# Patient Record
Sex: FEMALE | Race: WHITE | NOT HISPANIC OR LATINO | URBAN - METROPOLITAN AREA
[De-identification: names, ages, dates, MRNs, and addresses within clinical notes are randomized per-mention and may not be internally consistent; named-entity substitution may affect disease eponyms.]

---

## 2017-04-18 ENCOUNTER — EMERGENCY (EMERGENCY)
Facility: HOSPITAL | Age: 82
LOS: 1 days | Discharge: HOME | End: 2017-04-18
Attending: EMERGENCY MEDICINE | Admitting: EMERGENCY MEDICINE
Payer: MEDICARE

## 2017-04-18 VITALS
DIASTOLIC BLOOD PRESSURE: 85 MMHG | TEMPERATURE: 97 F | OXYGEN SATURATION: 100 % | HEART RATE: 58 BPM | SYSTOLIC BLOOD PRESSURE: 177 MMHG | RESPIRATION RATE: 18 BRPM

## 2017-04-18 VITALS
DIASTOLIC BLOOD PRESSURE: 62 MMHG | OXYGEN SATURATION: 99 % | SYSTOLIC BLOOD PRESSURE: 138 MMHG | HEART RATE: 53 BPM | RESPIRATION RATE: 16 BRPM

## 2017-04-18 DIAGNOSIS — M20.10 HALLUX VALGUS (ACQUIRED), UNSPECIFIED FOOT: Chronic | ICD-10-CM

## 2017-04-18 DIAGNOSIS — Z98.49 CATARACT EXTRACTION STATUS, UNSPECIFIED EYE: Chronic | ICD-10-CM

## 2017-04-18 LAB
ALBUMIN SERPL ELPH-MCNC: 4.3 G/DL — SIGNIFICANT CHANGE UP (ref 3.3–5)
ALP SERPL-CCNC: 69 U/L — SIGNIFICANT CHANGE UP (ref 40–120)
ALT FLD-CCNC: 15 U/L — SIGNIFICANT CHANGE UP (ref 4–33)
AST SERPL-CCNC: 17 U/L — SIGNIFICANT CHANGE UP (ref 4–32)
BASOPHILS # BLD AUTO: 0.01 K/UL — SIGNIFICANT CHANGE UP (ref 0–0.2)
BASOPHILS NFR BLD AUTO: 0.2 % — SIGNIFICANT CHANGE UP (ref 0–2)
BILIRUB SERPL-MCNC: 0.3 MG/DL — SIGNIFICANT CHANGE UP (ref 0.2–1.2)
BUN SERPL-MCNC: 15 MG/DL — SIGNIFICANT CHANGE UP (ref 7–23)
CALCIUM SERPL-MCNC: 8.9 MG/DL — SIGNIFICANT CHANGE UP (ref 8.4–10.5)
CHLORIDE SERPL-SCNC: 92 MMOL/L — LOW (ref 98–107)
CO2 SERPL-SCNC: 27 MMOL/L — SIGNIFICANT CHANGE UP (ref 22–31)
CREAT SERPL-MCNC: 0.7 MG/DL — SIGNIFICANT CHANGE UP (ref 0.5–1.3)
EOSINOPHIL # BLD AUTO: 0.13 K/UL — SIGNIFICANT CHANGE UP (ref 0–0.5)
EOSINOPHIL NFR BLD AUTO: 2.1 % — SIGNIFICANT CHANGE UP (ref 0–6)
GLUCOSE SERPL-MCNC: 111 MG/DL — HIGH (ref 70–99)
HCT VFR BLD CALC: 36.3 % — SIGNIFICANT CHANGE UP (ref 34.5–45)
HGB BLD-MCNC: 12.3 G/DL — SIGNIFICANT CHANGE UP (ref 11.5–15.5)
IMM GRANULOCYTES NFR BLD AUTO: 0 % — SIGNIFICANT CHANGE UP (ref 0–1.5)
LYMPHOCYTES # BLD AUTO: 2.1 K/UL — SIGNIFICANT CHANGE UP (ref 1–3.3)
LYMPHOCYTES # BLD AUTO: 33.2 % — SIGNIFICANT CHANGE UP (ref 13–44)
MCHC RBC-ENTMCNC: 30.1 PG — SIGNIFICANT CHANGE UP (ref 27–34)
MCHC RBC-ENTMCNC: 33.9 % — SIGNIFICANT CHANGE UP (ref 32–36)
MCV RBC AUTO: 89 FL — SIGNIFICANT CHANGE UP (ref 80–100)
MONOCYTES # BLD AUTO: 0.33 K/UL — SIGNIFICANT CHANGE UP (ref 0–0.9)
MONOCYTES NFR BLD AUTO: 5.2 % — SIGNIFICANT CHANGE UP (ref 2–14)
NEUTROPHILS # BLD AUTO: 3.76 K/UL — SIGNIFICANT CHANGE UP (ref 1.8–7.4)
NEUTROPHILS NFR BLD AUTO: 59.3 % — SIGNIFICANT CHANGE UP (ref 43–77)
PLATELET # BLD AUTO: 257 K/UL — SIGNIFICANT CHANGE UP (ref 150–400)
PMV BLD: 9.7 FL — SIGNIFICANT CHANGE UP (ref 7–13)
POTASSIUM SERPL-MCNC: 4.1 MMOL/L — SIGNIFICANT CHANGE UP (ref 3.5–5.3)
POTASSIUM SERPL-SCNC: 4.1 MMOL/L — SIGNIFICANT CHANGE UP (ref 3.5–5.3)
PROT SERPL-MCNC: 7 G/DL — SIGNIFICANT CHANGE UP (ref 6–8.3)
RBC # BLD: 4.08 M/UL — SIGNIFICANT CHANGE UP (ref 3.8–5.2)
RBC # FLD: 12.1 % — SIGNIFICANT CHANGE UP (ref 10.3–14.5)
SODIUM SERPL-SCNC: 130 MMOL/L — LOW (ref 135–145)
WBC # BLD: 6.33 K/UL — SIGNIFICANT CHANGE UP (ref 3.8–10.5)
WBC # FLD AUTO: 6.33 K/UL — SIGNIFICANT CHANGE UP (ref 3.8–10.5)

## 2017-04-18 PROCEDURE — 93971 EXTREMITY STUDY: CPT | Mod: 26,LT

## 2017-04-18 PROCEDURE — 99285 EMERGENCY DEPT VISIT HI MDM: CPT | Mod: GC

## 2017-04-18 RX ORDER — METOCLOPRAMIDE HCL 10 MG
10 TABLET ORAL ONCE
Qty: 0 | Refills: 0 | Status: COMPLETED | OUTPATIENT
Start: 2017-04-18 | End: 2017-04-18

## 2017-04-18 RX ORDER — KETOROLAC TROMETHAMINE 30 MG/ML
15 SYRINGE (ML) INJECTION ONCE
Qty: 0 | Refills: 0 | Status: DISCONTINUED | OUTPATIENT
Start: 2017-04-18 | End: 2017-04-18

## 2017-04-18 RX ORDER — OXYCODONE HYDROCHLORIDE 5 MG/1
1 TABLET ORAL
Qty: 20 | Refills: 0 | OUTPATIENT
Start: 2017-04-18 | End: 2017-04-23

## 2017-04-18 RX ADMIN — Medication 10 MILLIGRAM(S): at 20:16

## 2017-04-18 RX ADMIN — Medication 15 MILLIGRAM(S): at 21:30

## 2017-04-18 RX ADMIN — Medication 15 MILLIGRAM(S): at 21:13

## 2017-04-18 NOTE — ED PROVIDER NOTE - OBJECTIVE STATEMENT
82 yo F states has been having LBP and R knee pain for ~1 wk, atraumatic, and notes elev. BPs over this time as well. Went to PMD and was Rx BP med 5d ago. Last night at 2A had inc. in HA and noted elev. BP. Saw MD and was Rx new additional BP med, which she took x 1. Tonight to ED for cont. HA and elev. BP. Denies CP/N/V/SOB/diaphoresis, denies change in vision, denies change in speech, no neck pain or stiffness, ambulates easily but c/o R knee pain.

## 2017-04-18 NOTE — ED PROVIDER NOTE - PROGRESS NOTE DETAILS
Pt. with BP normalized after HA relief with Reglan. cont. to c/o R knee and now states has L sided CP with area of tenderness to lateral CW, non-pleuritic. EKG normal.

## 2017-04-18 NOTE — ED ADULT NURSE NOTE - OBJECTIVE STATEMENT
headache started at 0200. pt took tylenol without success. started iv 20g left ac, blood drawn. photophobia and nausea.

## 2017-04-18 NOTE — ED ADULT TRIAGE NOTE - CHIEF COMPLAINT QUOTE
Pt c/o headache that began yesterday with no relief from tylenol. Associated with N/V, photosensitivity and floaters in left eye. Denies any PMH.

## 2017-04-18 NOTE — ED ADULT NURSE REASSESSMENT NOTE - NS ED NURSE REASSESS COMMENT FT1
pt. resting in bed, reports an improvement in her HA down to a 3/10 but now reports pain to the L arm after administration of MD Pete Hogan at the bedside re-assessing pt., advised to administer Toradol and re-assess.  Vital signs stable. Will continue to monitor.

## 2017-05-16 ENCOUNTER — EMERGENCY (EMERGENCY)
Facility: HOSPITAL | Age: 82
LOS: 1 days | Discharge: HOME | End: 2017-05-16
Attending: EMERGENCY MEDICINE | Admitting: EMERGENCY MEDICINE
Payer: MEDICARE

## 2017-05-16 VITALS
SYSTOLIC BLOOD PRESSURE: 163 MMHG | DIASTOLIC BLOOD PRESSURE: 81 MMHG | HEART RATE: 60 BPM | TEMPERATURE: 98 F | RESPIRATION RATE: 18 BRPM | OXYGEN SATURATION: 99 %

## 2017-05-16 DIAGNOSIS — M20.10 HALLUX VALGUS (ACQUIRED), UNSPECIFIED FOOT: Chronic | ICD-10-CM

## 2017-05-16 DIAGNOSIS — Z98.49 CATARACT EXTRACTION STATUS, UNSPECIFIED EYE: Chronic | ICD-10-CM

## 2017-05-16 LAB
ALBUMIN SERPL ELPH-MCNC: 4.2 G/DL — SIGNIFICANT CHANGE UP (ref 3.3–5)
ALP SERPL-CCNC: 70 U/L — SIGNIFICANT CHANGE UP (ref 40–120)
ALT FLD-CCNC: 15 U/L — SIGNIFICANT CHANGE UP (ref 4–33)
APTT BLD: 26.6 SEC — LOW (ref 27.5–37.4)
AST SERPL-CCNC: 19 U/L — SIGNIFICANT CHANGE UP (ref 4–32)
BASOPHILS # BLD AUTO: 0.01 K/UL — SIGNIFICANT CHANGE UP (ref 0–0.2)
BASOPHILS NFR BLD AUTO: 0.2 % — SIGNIFICANT CHANGE UP (ref 0–2)
BILIRUB SERPL-MCNC: 0.5 MG/DL — SIGNIFICANT CHANGE UP (ref 0.2–1.2)
BUN SERPL-MCNC: 13 MG/DL — SIGNIFICANT CHANGE UP (ref 7–23)
CALCIUM SERPL-MCNC: 9.1 MG/DL — SIGNIFICANT CHANGE UP (ref 8.4–10.5)
CHLORIDE SERPL-SCNC: 87 MMOL/L — LOW (ref 98–107)
CK MB BLD-MCNC: 2.28 NG/ML — SIGNIFICANT CHANGE UP (ref 1–4.7)
CK MB BLD-MCNC: SIGNIFICANT CHANGE UP (ref 0–2.5)
CK SERPL-CCNC: 49 U/L — SIGNIFICANT CHANGE UP (ref 25–170)
CO2 SERPL-SCNC: 24 MMOL/L — SIGNIFICANT CHANGE UP (ref 22–31)
CREAT SERPL-MCNC: 0.52 MG/DL — SIGNIFICANT CHANGE UP (ref 0.5–1.3)
EOSINOPHIL # BLD AUTO: 0.02 K/UL — SIGNIFICANT CHANGE UP (ref 0–0.5)
EOSINOPHIL NFR BLD AUTO: 0.4 % — SIGNIFICANT CHANGE UP (ref 0–6)
ERYTHROCYTE [SEDIMENTATION RATE] IN BLOOD: 12 MM/HR — SIGNIFICANT CHANGE UP (ref 4–25)
GLUCOSE SERPL-MCNC: 128 MG/DL — HIGH (ref 70–99)
HCT VFR BLD CALC: 34.5 % — SIGNIFICANT CHANGE UP (ref 34.5–45)
HGB BLD-MCNC: 12.3 G/DL — SIGNIFICANT CHANGE UP (ref 11.5–15.5)
IMM GRANULOCYTES NFR BLD AUTO: 0 % — SIGNIFICANT CHANGE UP (ref 0–1.5)
INR BLD: 0.96 — SIGNIFICANT CHANGE UP (ref 0.88–1.17)
LYMPHOCYTES # BLD AUTO: 1.23 K/UL — SIGNIFICANT CHANGE UP (ref 1–3.3)
LYMPHOCYTES # BLD AUTO: 23.7 % — SIGNIFICANT CHANGE UP (ref 13–44)
MCHC RBC-ENTMCNC: 30.3 PG — SIGNIFICANT CHANGE UP (ref 27–34)
MCHC RBC-ENTMCNC: 35.7 % — SIGNIFICANT CHANGE UP (ref 32–36)
MCV RBC AUTO: 85 FL — SIGNIFICANT CHANGE UP (ref 80–100)
MONOCYTES # BLD AUTO: 0.39 K/UL — SIGNIFICANT CHANGE UP (ref 0–0.9)
MONOCYTES NFR BLD AUTO: 7.5 % — SIGNIFICANT CHANGE UP (ref 2–14)
NEUTROPHILS # BLD AUTO: 3.53 K/UL — SIGNIFICANT CHANGE UP (ref 1.8–7.4)
NEUTROPHILS NFR BLD AUTO: 68.2 % — SIGNIFICANT CHANGE UP (ref 43–77)
PLATELET # BLD AUTO: 256 K/UL — SIGNIFICANT CHANGE UP (ref 150–400)
PMV BLD: 9.6 FL — SIGNIFICANT CHANGE UP (ref 7–13)
POTASSIUM SERPL-MCNC: 3.2 MMOL/L — LOW (ref 3.5–5.3)
POTASSIUM SERPL-SCNC: 3.2 MMOL/L — LOW (ref 3.5–5.3)
PROT SERPL-MCNC: 6.8 G/DL — SIGNIFICANT CHANGE UP (ref 6–8.3)
PROTHROM AB SERPL-ACNC: 10.8 SEC — SIGNIFICANT CHANGE UP (ref 9.8–13.1)
RBC # BLD: 4.06 M/UL — SIGNIFICANT CHANGE UP (ref 3.8–5.2)
RBC # FLD: 12.1 % — SIGNIFICANT CHANGE UP (ref 10.3–14.5)
SODIUM SERPL-SCNC: 126 MMOL/L — LOW (ref 135–145)
TROPONIN T SERPL-MCNC: < 0.06 NG/ML — SIGNIFICANT CHANGE UP (ref 0–0.06)
WBC # BLD: 5.18 K/UL — SIGNIFICANT CHANGE UP (ref 3.8–10.5)
WBC # FLD AUTO: 5.18 K/UL — SIGNIFICANT CHANGE UP (ref 3.8–10.5)

## 2017-05-16 PROCEDURE — 99285 EMERGENCY DEPT VISIT HI MDM: CPT | Mod: GC

## 2017-05-16 RX ORDER — METOCLOPRAMIDE HCL 10 MG
10 TABLET ORAL ONCE
Qty: 0 | Refills: 0 | Status: COMPLETED | OUTPATIENT
Start: 2017-05-16 | End: 2017-05-16

## 2017-05-16 RX ORDER — ACETAMINOPHEN 500 MG
975 TABLET ORAL ONCE
Qty: 0 | Refills: 0 | Status: COMPLETED | OUTPATIENT
Start: 2017-05-16 | End: 2017-05-16

## 2017-05-16 RX ORDER — SODIUM CHLORIDE 9 MG/ML
1000 INJECTION INTRAMUSCULAR; INTRAVENOUS; SUBCUTANEOUS ONCE
Qty: 0 | Refills: 0 | Status: COMPLETED | OUTPATIENT
Start: 2017-05-16 | End: 2017-05-16

## 2017-05-16 RX ADMIN — SODIUM CHLORIDE 1000 MILLILITER(S): 9 INJECTION INTRAMUSCULAR; INTRAVENOUS; SUBCUTANEOUS at 22:45

## 2017-05-16 RX ADMIN — Medication 975 MILLIGRAM(S): at 22:35

## 2017-05-16 RX ADMIN — Medication 10 MILLIGRAM(S): at 22:45

## 2017-05-16 NOTE — ED ADULT NURSE NOTE - OBJECTIVE STATEMENT
rec'd pt aaox3 in TR B c/o 10/10 HA x 1 month, pt. followed by a neurologist who stated that the blood work done this morning "showed signs of inflammation" as per family, unsure of which lab markers the neurologist was referring to, MD also stated that pt. is at risk for a CVA.  Pt. reports nausea and 4 episodes of non-bloody emesis, family states that pt. is nauseous on a daily basis and vomits almost everyday.  Pt. had an MRI completed which showed "microvascular ischemic changes".  Pt. reports generalized weakness, sensitivity to light, dizziness.  PERRLA.  No neuro deficits noted at this time.  Pt. presently hypertensive, states that HA's become more severe when pt.'s BP is high, pt. takes Losartan 50mg BID, last dose at approximately 6pm, but vomited shortly thereafter, pt. also took 500mg of tylenol at about 7pm but reported no relief.  Pt. denies CP/SOB, urinary symptoms, visual changes.  20G IV saline lock placed in the R AC, labs drawn and sent as ordered.   Will continue to monitor.

## 2017-05-16 NOTE — ED PROVIDER NOTE - PHYSICAL EXAMINATION
Neck supple without nuchal rigidity or stiffness.  Full range of motion.  Patient remains alert and oriented to person, place, and time without confusion, lethargy, or alteration in mental status.  No focal neurological sign.   No petechial rash or skin lesions. Neck supple without nuchal rigidity or stiffness.  Full range of motion.  Patient remains alert and oriented to person, place, and time without confusion, lethargy, or alteration in mental status.  No focal neurological sign.   No petechial rash or skin lesions.  ATTENDING PHYSICAL EXAM DR. Walker ***GEN - NAD; well appearing; A+O x3 ***HEAD - NC/AT ***EYES/NOSE - PERRL, EOMI, mucous membranes moist, no discharge, no Temporal artery area tenderness b/l ***THROAT: Oral cavity and pharynx normal. No inflammation, swelling, exudate, or lesions.  ***NECK: Neck supple, non-tender without lymphadenopathy, no masses, no JVD.   ***PULMONARY - CTA b/l, symmetric breath sounds. ***CARDIAC -s1s2, RRR, no M,R,G  ***ABDOMEN - +BS, ND, NT, soft, no guarding, no rebound, no masses   ***BACK - no CVA tenderness, Normal  spine ***EXTREMITIES - symmetric pulses, 2+ dp, capillary refill < 2 seconds, no clubbing, no cyanosis, no edema ***SKIN - no rash or bruising   ***NEUROLOGIC - alert, CN 2-12 intact, reflexes nl, sensation nl, coordination nl, finger to nose nl, romberg negative, motor 5/5 RUE/LUE/RLE/LLE/EHL/Plantar flexion, no pronator drift, gait nl

## 2017-05-16 NOTE — ED PROVIDER NOTE - MEDICAL DECISION MAKING DETAILS
81 YOF PMH arthritis, GERD, DJD, p/w headache and vision changes for the past month.  HTN.  Non-focal neurology exam.  R/o temporal arteritis, CTH to evaluate for interval change.  If HTN persists Tx for hypertensive urgency.  Neurology consult.  Likely admit.  No clinical findings of infection. PT afebrile, no AMS, or neck stiffness.  Treat symptomatically and reassess. 81 YOF PMH arthritis, GERD, DJD, p/w headache and vision changes for the past month.  HTN.  Non-focal neurology exam.  R/o temporal arteritis, CTH to evaluate for interval change.  If HTN persists Tx for hypertensive urgency.  Neurology consult. No clinical findings of infection. PT afebrile, no AMS, or neck stiffness.  Treat symptomatically and reassess.

## 2017-05-16 NOTE — ED PROVIDER NOTE - PROGRESS NOTE DETAILS
pt states headache is improved, does not want more meds, will follow up with neurology as an outpatient: Dr. Mooney: [phone]

## 2017-05-16 NOTE — ED ADULT TRIAGE NOTE - CHIEF COMPLAINT QUOTE
pt c/o headache x 1 month seen and treated in ED for HA dx with microvascular ischemic disease. family states pt had labwork done today and was told she had suspected inflammation, possible intracranial inflammation, and was told pt was at risk for cva. family also endorses pt having generalized weakness, nausea, vomiting, unable to keep anything down. family also reports pt has generalized weakness and "eye strain" intermittently x 1 month and intermittent confusion. family also reports pt headaches are worse at night. pt appears very uncomfortable.  PMHX: herniated disc, arthritis    pt evaluated by Dr. Navarrete, no stroke code called.

## 2017-05-16 NOTE — ED PROVIDER NOTE - OBJECTIVE STATEMENT
81 YOF PMH arthritis, GERD, DJD, p/w headache and vision changes for the past month.  Sx gradual onset and persistent.  Associated nausea, vomiting, tinnitus, visual changes, and new onset HT.  PT seen by neurologist who performed an MRI 02 May 17 that demonstrated microvascular change. Pt allergic to Motrin has been taking Tylenol for her Sx without relief.  Pt seen by neurologist today who sent more blood work which is pending.  No fevers, chills, sweats, weight loss, fatigue, or malaise.  No neck stiffness, altered mental status, rash, fever. 81 YOF PMH arthritis, GERD, DJD, p/w headache for the past month.  Sx gradual onset and persistent.  Associated nausea, vomiting, tinnitus, visual changes, and new onset HT.  PT seen by neurologist who performed an MRI 02 May 17 that demonstrated microvascular change. Pt allergic to Motrin has been taking Tylenol for her Sx without relief.  Pt seen by neurologist today who sent more blood work which is pending.  No fevers, chills, sweats, weight loss, fatigue, or malaise.  No neck stiffness, altered mental status, rash, fever. 81 YOF PMH arthritis, GERD, DJD, p/w headache for the past month.  Sx gradual onset and persistent.  Associated nausea, vomiting, tinnitus, visual changes, and new onset HT.  PT seen by neurologist who performed an MRI 02 May 17 that demonstrated microvascular change. Pt allergic to Motrin has been taking Tylenol for her Sx without relief.  Pt seen by neurologist today who sent more blood work which is pending.  No fevers, chills, sweats, weight loss, fatigue, or malaise.  No neck stiffness, altered mental status, rash, fever.  Attending - Agree with above.  I evaluated patient myself.  82 y/o F with children at bedside, headache constantly present x 2 months per patient, waxes and wanes in intensity.  Associated nausea and vomiting.  Tinnitus.  Has had neurological w/u including MRI, which has been unrevealing for etiology.  To neurologist today who recommended she gets blood test for 'inflammation', likely ESR.  No preceding trauma or illness.  No fever.

## 2017-05-17 VITALS
DIASTOLIC BLOOD PRESSURE: 67 MMHG | SYSTOLIC BLOOD PRESSURE: 138 MMHG | OXYGEN SATURATION: 100 % | RESPIRATION RATE: 12 BRPM | HEART RATE: 56 BPM | TEMPERATURE: 98 F

## 2017-05-17 PROCEDURE — 70496 CT ANGIOGRAPHY HEAD: CPT | Mod: 26

## 2017-05-17 PROCEDURE — 70498 CT ANGIOGRAPHY NECK: CPT | Mod: 26,52

## 2017-05-17 RX ORDER — ONDANSETRON 8 MG/1
1 TABLET, FILM COATED ORAL
Qty: 12 | Refills: 0 | OUTPATIENT
Start: 2017-05-17 | End: 2017-05-21

## 2017-05-17 RX ORDER — DEXAMETHASONE 0.5 MG/5ML
10 ELIXIR ORAL ONCE
Qty: 0 | Refills: 0 | Status: COMPLETED | OUTPATIENT
Start: 2017-05-17 | End: 2017-05-17

## 2017-05-17 RX ORDER — ONDANSETRON 8 MG/1
4 TABLET, FILM COATED ORAL ONCE
Qty: 0 | Refills: 0 | Status: COMPLETED | OUTPATIENT
Start: 2017-05-17 | End: 2017-05-17

## 2017-05-17 RX ORDER — DEXAMETHASONE 0.5 MG/5ML
10 ELIXIR ORAL ONCE
Qty: 0 | Refills: 0 | Status: DISCONTINUED | OUTPATIENT
Start: 2017-05-17 | End: 2017-05-17

## 2017-05-17 RX ADMIN — ONDANSETRON 4 MILLIGRAM(S): 8 TABLET, FILM COATED ORAL at 07:35

## 2017-05-17 RX ADMIN — Medication 102 MILLIGRAM(S): at 01:32

## 2018-01-29 ENCOUNTER — INPATIENT (INPATIENT)
Facility: HOSPITAL | Age: 83
LOS: 6 days | Discharge: HOME | DRG: 194 | End: 2018-02-05
Attending: INTERNAL MEDICINE | Admitting: INTERNAL MEDICINE
Payer: MEDICARE

## 2018-01-29 VITALS
RESPIRATION RATE: 18 BRPM | TEMPERATURE: 99 F | DIASTOLIC BLOOD PRESSURE: 62 MMHG | SYSTOLIC BLOOD PRESSURE: 163 MMHG | OXYGEN SATURATION: 99 % | HEART RATE: 80 BPM

## 2018-01-29 DIAGNOSIS — Z29.9 ENCOUNTER FOR PROPHYLACTIC MEASURES, UNSPECIFIED: ICD-10-CM

## 2018-01-29 DIAGNOSIS — E87.1 HYPO-OSMOLALITY AND HYPONATREMIA: ICD-10-CM

## 2018-01-29 DIAGNOSIS — M20.10 HALLUX VALGUS (ACQUIRED), UNSPECIFIED FOOT: Chronic | ICD-10-CM

## 2018-01-29 DIAGNOSIS — Z98.49 CATARACT EXTRACTION STATUS, UNSPECIFIED EYE: Chronic | ICD-10-CM

## 2018-01-29 DIAGNOSIS — R53.1 WEAKNESS: ICD-10-CM

## 2018-01-29 LAB
ALBUMIN SERPL ELPH-MCNC: 3.6 G/DL — SIGNIFICANT CHANGE UP (ref 3.5–5)
ALP SERPL-CCNC: 102 U/L — SIGNIFICANT CHANGE UP (ref 40–120)
ALT FLD-CCNC: 20 U/L DA — SIGNIFICANT CHANGE UP (ref 10–60)
ANION GAP SERPL CALC-SCNC: 8 MMOL/L — SIGNIFICANT CHANGE UP (ref 5–17)
APPEARANCE UR: CLEAR — SIGNIFICANT CHANGE UP
APTT BLD: 26.7 SEC — LOW (ref 27.5–37.4)
AST SERPL-CCNC: 23 U/L — SIGNIFICANT CHANGE UP (ref 10–40)
BASOPHILS # BLD AUTO: 0 K/UL — SIGNIFICANT CHANGE UP (ref 0–0.2)
BASOPHILS NFR BLD AUTO: 0.5 % — SIGNIFICANT CHANGE UP (ref 0–2)
BILIRUB SERPL-MCNC: 0.5 MG/DL — SIGNIFICANT CHANGE UP (ref 0.2–1.2)
BILIRUB UR-MCNC: NEGATIVE — SIGNIFICANT CHANGE UP
BUN SERPL-MCNC: 9 MG/DL — SIGNIFICANT CHANGE UP (ref 7–18)
CALCIUM SERPL-MCNC: 8.2 MG/DL — LOW (ref 8.4–10.5)
CHLORIDE SERPL-SCNC: 93 MMOL/L — LOW (ref 96–108)
CK MB CFR SERPL CALC: <1 NG/ML — SIGNIFICANT CHANGE UP (ref 0–3.6)
CO2 SERPL-SCNC: 27 MMOL/L — SIGNIFICANT CHANGE UP (ref 22–31)
COLOR SPEC: YELLOW — SIGNIFICANT CHANGE UP
CREAT SERPL-MCNC: 0.64 MG/DL — SIGNIFICANT CHANGE UP (ref 0.5–1.3)
DIFF PNL FLD: ABNORMAL
EOSINOPHIL # BLD AUTO: 0 K/UL — SIGNIFICANT CHANGE UP (ref 0–0.5)
EOSINOPHIL NFR BLD AUTO: 0 % — SIGNIFICANT CHANGE UP (ref 0–6)
GLUCOSE SERPL-MCNC: 98 MG/DL — SIGNIFICANT CHANGE UP (ref 70–99)
GLUCOSE UR QL: NEGATIVE — SIGNIFICANT CHANGE UP
HCT VFR BLD CALC: 37.7 % — SIGNIFICANT CHANGE UP (ref 34.5–45)
HGB BLD-MCNC: 12.8 G/DL — SIGNIFICANT CHANGE UP (ref 11.5–15.5)
INR BLD: 1.04 RATIO — SIGNIFICANT CHANGE UP (ref 0.88–1.16)
KETONES UR-MCNC: ABNORMAL
LEUKOCYTE ESTERASE UR-ACNC: NEGATIVE — SIGNIFICANT CHANGE UP
LYMPHOCYTES # BLD AUTO: 0.5 K/UL — LOW (ref 1–3.3)
LYMPHOCYTES # BLD AUTO: 6 % — LOW (ref 13–44)
MAGNESIUM SERPL-MCNC: 1.8 MG/DL — SIGNIFICANT CHANGE UP (ref 1.6–2.6)
MCHC RBC-ENTMCNC: 30.2 PG — SIGNIFICANT CHANGE UP (ref 27–34)
MCHC RBC-ENTMCNC: 33.9 GM/DL — SIGNIFICANT CHANGE UP (ref 32–36)
MCV RBC AUTO: 89.1 FL — SIGNIFICANT CHANGE UP (ref 80–100)
MONOCYTES # BLD AUTO: 0.5 K/UL — SIGNIFICANT CHANGE UP (ref 0–0.9)
MONOCYTES NFR BLD AUTO: 5.9 % — SIGNIFICANT CHANGE UP (ref 2–14)
NEUTROPHILS # BLD AUTO: 7.4 K/UL — SIGNIFICANT CHANGE UP (ref 1.8–7.4)
NEUTROPHILS NFR BLD AUTO: 87.5 % — HIGH (ref 43–77)
NITRITE UR-MCNC: NEGATIVE — SIGNIFICANT CHANGE UP
PH UR: 7 — SIGNIFICANT CHANGE UP (ref 5–8)
PLATELET # BLD AUTO: 174 K/UL — SIGNIFICANT CHANGE UP (ref 150–400)
POTASSIUM SERPL-MCNC: 3.6 MMOL/L — SIGNIFICANT CHANGE UP (ref 3.5–5.3)
POTASSIUM SERPL-SCNC: 3.6 MMOL/L — SIGNIFICANT CHANGE UP (ref 3.5–5.3)
PROT SERPL-MCNC: 7.4 G/DL — SIGNIFICANT CHANGE UP (ref 6–8.3)
PROT UR-MCNC: 15
PROTHROM AB SERPL-ACNC: 11.4 SEC — SIGNIFICANT CHANGE UP (ref 9.8–12.7)
RBC # BLD: 4.23 M/UL — SIGNIFICANT CHANGE UP (ref 3.8–5.2)
RBC # FLD: 11.2 % — SIGNIFICANT CHANGE UP (ref 10.3–14.5)
SODIUM SERPL-SCNC: 128 MMOL/L — LOW (ref 135–145)
SP GR SPEC: 1 — LOW (ref 1.01–1.02)
TROPONIN I SERPL-MCNC: <0.015 NG/ML — SIGNIFICANT CHANGE UP (ref 0–0.04)
UROBILINOGEN FLD QL: NEGATIVE — SIGNIFICANT CHANGE UP
WBC # BLD: 8.5 K/UL — SIGNIFICANT CHANGE UP (ref 3.8–10.5)
WBC # FLD AUTO: 8.5 K/UL — SIGNIFICANT CHANGE UP (ref 3.8–10.5)

## 2018-01-29 PROCEDURE — 71045 X-RAY EXAM CHEST 1 VIEW: CPT | Mod: 26

## 2018-01-29 PROCEDURE — 70450 CT HEAD/BRAIN W/O DYE: CPT | Mod: 26

## 2018-01-29 PROCEDURE — 99285 EMERGENCY DEPT VISIT HI MDM: CPT

## 2018-01-29 PROCEDURE — 71250 CT THORAX DX C-: CPT | Mod: 26

## 2018-01-29 RX ORDER — ATORVASTATIN CALCIUM 80 MG/1
1 TABLET, FILM COATED ORAL
Qty: 0 | Refills: 0 | COMMUNITY

## 2018-01-29 RX ORDER — PANTOPRAZOLE SODIUM 20 MG/1
40 TABLET, DELAYED RELEASE ORAL
Qty: 0 | Refills: 0 | Status: DISCONTINUED | OUTPATIENT
Start: 2018-01-29 | End: 2018-02-05

## 2018-01-29 RX ORDER — SODIUM CHLORIDE 9 MG/ML
1000 INJECTION INTRAMUSCULAR; INTRAVENOUS; SUBCUTANEOUS ONCE
Qty: 0 | Refills: 0 | Status: COMPLETED | OUTPATIENT
Start: 2018-01-29 | End: 2018-01-29

## 2018-01-29 RX ORDER — CEFTRIAXONE 500 MG/1
INJECTION, POWDER, FOR SOLUTION INTRAMUSCULAR; INTRAVENOUS
Qty: 0 | Refills: 0 | Status: DISCONTINUED | OUTPATIENT
Start: 2018-01-29 | End: 2018-02-03

## 2018-01-29 RX ORDER — PANTOPRAZOLE SODIUM 20 MG/1
1 TABLET, DELAYED RELEASE ORAL
Qty: 0 | Refills: 0 | COMMUNITY

## 2018-01-29 RX ORDER — AZITHROMYCIN 500 MG/1
500 TABLET, FILM COATED ORAL ONCE
Qty: 0 | Refills: 0 | Status: COMPLETED | OUTPATIENT
Start: 2018-01-29 | End: 2018-01-29

## 2018-01-29 RX ORDER — ENOXAPARIN SODIUM 100 MG/ML
40 INJECTION SUBCUTANEOUS DAILY
Qty: 0 | Refills: 0 | Status: DISCONTINUED | OUTPATIENT
Start: 2018-01-29 | End: 2018-02-05

## 2018-01-29 RX ORDER — AZITHROMYCIN 500 MG/1
TABLET, FILM COATED ORAL
Qty: 0 | Refills: 0 | Status: DISCONTINUED | OUTPATIENT
Start: 2018-01-29 | End: 2018-02-03

## 2018-01-29 RX ORDER — CEFTRIAXONE 500 MG/1
1 INJECTION, POWDER, FOR SOLUTION INTRAMUSCULAR; INTRAVENOUS ONCE
Qty: 0 | Refills: 0 | Status: COMPLETED | OUTPATIENT
Start: 2018-01-29 | End: 2018-01-29

## 2018-01-29 RX ORDER — AZITHROMYCIN 500 MG/1
500 TABLET, FILM COATED ORAL EVERY 24 HOURS
Qty: 0 | Refills: 0 | Status: DISCONTINUED | OUTPATIENT
Start: 2018-01-30 | End: 2018-02-03

## 2018-01-29 RX ORDER — ALBUTEROL 90 UG/1
0 AEROSOL, METERED ORAL
Qty: 18 | Refills: 0 | COMMUNITY

## 2018-01-29 RX ORDER — IPRATROPIUM/ALBUTEROL SULFATE 18-103MCG
3 AEROSOL WITH ADAPTER (GRAM) INHALATION EVERY 6 HOURS
Qty: 0 | Refills: 0 | Status: DISCONTINUED | OUTPATIENT
Start: 2018-01-29 | End: 2018-02-05

## 2018-01-29 RX ORDER — METOCLOPRAMIDE HCL 10 MG
10 TABLET ORAL ONCE
Qty: 0 | Refills: 0 | Status: COMPLETED | OUTPATIENT
Start: 2018-01-29 | End: 2018-01-29

## 2018-01-29 RX ORDER — CEFTRIAXONE 500 MG/1
1 INJECTION, POWDER, FOR SOLUTION INTRAMUSCULAR; INTRAVENOUS EVERY 24 HOURS
Qty: 0 | Refills: 0 | Status: DISCONTINUED | OUTPATIENT
Start: 2018-01-30 | End: 2018-02-03

## 2018-01-29 RX ORDER — ATORVASTATIN CALCIUM 80 MG/1
10 TABLET, FILM COATED ORAL AT BEDTIME
Qty: 0 | Refills: 0 | Status: DISCONTINUED | OUTPATIENT
Start: 2018-01-29 | End: 2018-02-05

## 2018-01-29 RX ADMIN — ATORVASTATIN CALCIUM 10 MILLIGRAM(S): 80 TABLET, FILM COATED ORAL at 21:45

## 2018-01-29 RX ADMIN — AZITHROMYCIN 250 MILLIGRAM(S): 500 TABLET, FILM COATED ORAL at 22:19

## 2018-01-29 RX ADMIN — SODIUM CHLORIDE 1000 MILLILITER(S): 9 INJECTION INTRAMUSCULAR; INTRAVENOUS; SUBCUTANEOUS at 15:41

## 2018-01-29 RX ADMIN — Medication 10 MILLIGRAM(S): at 15:43

## 2018-01-29 RX ADMIN — Medication 3 MILLILITER(S): at 21:48

## 2018-01-29 RX ADMIN — CEFTRIAXONE 100 GRAM(S): 500 INJECTION, POWDER, FOR SOLUTION INTRAMUSCULAR; INTRAVENOUS at 21:45

## 2018-01-29 NOTE — H&P ADULT - ASSESSMENT
82F from home, lives alone PMH LBP w/ disc disease, Migraines who comes to the hospital for weakness x1 day. Patient has systemic weakness and is mildly warm to touch, however, no mai sick contacts - only daughter had slight cough, concern for possible viral resp illness vs possible pna due to opacities on cxr  patient also hyponatremic likely secondary to hctz sporadic use

## 2018-01-29 NOTE — H&P ADULT - NSHPREVIEWOFSYSTEMS_GEN_ALL_CORE
REVIEW OF SYSTEMS:  CONSTITUTIONAL: No fever, weight loss, or fatigue  EYES: No eye pain, visual disturbances, or discharge  ENMT:  No difficulty hearing, tinnitus, vertigo; No sinus or throat pain  NECK: No pain or stiffness  RESPIRATORY: cough, shortness of breath  CARDIOVASCULAR: No chest pain, palpitations, dizziness, or leg swelling  GASTROINTESTINAL: No abdominal or epigastric pain. No nausea, vomiting, or hematemesis; No diarrhea or constipation. No melena or hematochezia.  GENITOURINARY: No dysuria, frequency, hematuria, or incontinence  NEUROLOGICAL: whole body weakness  SKIN: No itching, burning, rashes, or lesions   LYMPH NODES: No enlarged glands  ENDOCRINE: No heat or cold intolerance; No hair loss  MUSCULOSKELETAL: No joint pain or swelling; No muscle, back, or extremity pain  PSYCHIATRIC: No depression, anxiety, mood swings, or difficulty sleeping  HEME/LYMPH: No easy bruising, or bleeding gums  ALLERY AND IMMUNOLOGIC: No hives or eczema

## 2018-01-29 NOTE — ED PROVIDER NOTE - MEDICAL DECISION MAKING DETAILS
patient pw weakness. patient pw weakness. labs reassuring. CT by my read shows nothing acute (right frontal hypoattenuation appears like old stroke). MAR will follow up final read. suspect viral process causing weakness. patient pw weakness. labs reassuring. CT by my read shows nothing acute (right frontal hypoattenuation appears like old stroke). MAR will follow up final read. suspect viral process causing weakness. radiologist reports probable meningioma.

## 2018-01-29 NOTE — ED PROVIDER NOTE - OBJECTIVE STATEMENT
Pertinent PMH/PSH/FHx/SHx and Review of Systems contained within:  82F hx of recurrent migraines pw headache and weakness. patient started coughing last night, but was otherwise well. this morning she called her daughter who brought her to the ER because the patient was barely talking and complaint of weakness. the patient denies cp, sob, abd pain, vomiting, fever. she endorses frontal ha and cough  Fh and Sh not otherwise contributory  ROS otherwise negative

## 2018-01-29 NOTE — H&P ADULT - PROBLEM SELECTOR PLAN 1
As per above, patient is suspected to have viral illness and patient may have pneumonia  -will get CT chest for further evaluation  -will not start on abx at this time with no WBC count and no fever in hospital  -if CT positive, will start on rocephin/azithro  -f/u procalcitonin, RVP

## 2018-01-29 NOTE — H&P ADULT - NSHPLABSRESULTS_GEN_ALL_CORE
No WBC count  H/H wnl  Coags wnl  Lytes low w/ Na at 128 other lytes wnl, Cr wnl  LFTs wnl  Trop neg  UA neg for infection  Head CT  shows possible small meningioma  CXR looks like possible infiltrate

## 2018-01-29 NOTE — H&P ADULT - HISTORY OF PRESENT ILLNESS
82F from home, lives alone PMH LBP w/ disc disease, Migraines who comes to the hospital for weakness x1 day. Patient normally lives alone and takes care of her own affairs - goes out, goes to the gym, etc. However, yesterday she had cough and some shortness of breath and she took some mucinex which did not help much. She called her daughter this morning to tell her about her problems and daughter suggested she stay home today and not go out. However, she became very weak and could not get up from the couch. Neighbor who checks in on her regularly saw her at home and very weak and called the daughter to come see her. Patient was found to be very weak. had difficulty talking because of the weakness so daughter brought her to hospital for evaluation.  Patient complains of back pain and mild respiratory distress. Is currently also very weak and has to be fed to eat although she chews well and her body feels warm.

## 2018-01-29 NOTE — H&P ADULT - NSHPPHYSICALEXAM_GEN_ALL_CORE
Vital Signs Last 24 Hrs  T(C): 37.2 (29 Jan 2018 19:16), Max: 37.3 (29 Jan 2018 14:36)  T(F): 99 (29 Jan 2018 19:16), Max: 99.2 (29 Jan 2018 14:36)  HR: 91 (29 Jan 2018 19:16) (80 - 91)  BP: 132/77 (29 Jan 2018 19:16) (132/77 - 163/62)  BP(mean): --  RR: 18 (29 Jan 2018 19:16) (18 - 18)  SpO2: 99% (29 Jan 2018 19:16) (98% - 99%)    GENERAL: NAD, well-groomed, well-developed  HEAD:  Atraumatic, Normocephalic  EYES: EOMI, PERRLA, conjunctiva and sclera clear  ENMT: No tonsillar erythema, exudates, or enlargement; Moist mucous membranes  NECK: Supple, No JVD, Normal thyroid  NERVOUS SYSTEM:  aaox3, answers questions, power 4/5 in bilateral upper and lower extremities, falls asleep easily  CHEST/LUNG: Clear to auscultation bilaterally; No rales, rhonchi, wheezing, or rubs  HEART: Regular rate and rhythm; No murmurs, rubs, or gallops  ABDOMEN: Soft, Nontender, Nondistended; Bowel sounds present  EXTREMITIES:  2+ Peripheral Pulses, No clubbing, cyanosis, or edema  SKIN: warm to touch

## 2018-01-29 NOTE — ED ADULT NURSE NOTE - ED STAT RN HANDOFF DETAILS
Patient received from Nurse Floyd lying supine in bed , awake breathing not distressed Reports no complaints when asked. Is being admitted, awaiting bed availability.

## 2018-01-29 NOTE — ED PROVIDER NOTE - PHYSICAL EXAMINATION
Gen: Alert, looks uncomfortable  Head: NC, AT   Eyes: PERRL, EOMI, normal lids/conjunctiva  ENT: normal hearing, patent oropharynx without erythema/exudate, uvula midline  Neck: supple, no tenderness, Trachea midline  Pulm: Bilateral BS, normal resp effort, no wheeze/stridor/retractions  CV: RRR, no M/R/G, 2+ radial and dp pulses bl, no edema  Abd: soft, NT/ND, +BS, no hepatosplenomegaly  Mskel: extremities x4 with normal ROM and no joint effusions. no ctl spine ttp.   Skin: no rash, no bruising   Neuro: AAOx3, no sensory/motor deficits, CN 2-12 intact. patient speaking minimally.

## 2018-01-29 NOTE — ED ADULT NURSE REASSESSMENT NOTE - NS ED NURSE REASSESS COMMENT FT1
1745 - pt awake,alert,oriented x 3 pt still feeling nauseaus but no vomiting episode in ED. pt admitted . no distress  will continue care and treatment.

## 2018-01-29 NOTE — H&P ADULT - NSHPSOCIALHISTORY_GEN_ALL_CORE
no smoking, rare wine intake and no illicit drug use  as per PMD Dr. Pickard, patient is likely noncompliant with prescribed meds and daughter says as well that patient doesn't really believe in medications.

## 2018-01-29 NOTE — H&P ADULT - PROBLEM SELECTOR PLAN 2
patient has hyponatremia of 128 in the setting of prescription of HCTZ  -however patient denies using it daily and PMD also thinks patient is noncompliant  -will get rehydration  -patient is only 8 mEq from normal Na  -will just follow in AM

## 2018-01-30 LAB
ANION GAP SERPL CALC-SCNC: 10 MMOL/L — SIGNIFICANT CHANGE UP (ref 5–17)
BUN SERPL-MCNC: 11 MG/DL — SIGNIFICANT CHANGE UP (ref 7–18)
CALCIUM SERPL-MCNC: 8 MG/DL — LOW (ref 8.4–10.5)
CHLORIDE SERPL-SCNC: 93 MMOL/L — LOW (ref 96–108)
CHOLEST SERPL-MCNC: 174 MG/DL — SIGNIFICANT CHANGE UP (ref 10–199)
CO2 SERPL-SCNC: 25 MMOL/L — SIGNIFICANT CHANGE UP (ref 22–31)
CREAT SERPL-MCNC: 0.72 MG/DL — SIGNIFICANT CHANGE UP (ref 0.5–1.3)
FERRITIN SERPL-MCNC: 106 NG/ML — SIGNIFICANT CHANGE UP (ref 15–150)
FOLATE SERPL-MCNC: 14.1 NG/ML — SIGNIFICANT CHANGE UP (ref 4.8–24.2)
GLUCOSE SERPL-MCNC: 125 MG/DL — HIGH (ref 70–99)
HBA1C BLD-MCNC: 5.6 % — SIGNIFICANT CHANGE UP (ref 4–5.6)
HCOV NL63 RNA SPEC QL NAA+PROBE: DETECTED
HCT VFR BLD CALC: 34.8 % — SIGNIFICANT CHANGE UP (ref 34.5–45)
HDLC SERPL-MCNC: 62 MG/DL — SIGNIFICANT CHANGE UP (ref 40–125)
HGB BLD-MCNC: 11.6 G/DL — SIGNIFICANT CHANGE UP (ref 11.5–15.5)
IRON SATN MFR SERPL: 12 UG/DL — LOW (ref 40–150)
IRON SATN MFR SERPL: 4 % — LOW (ref 15–50)
LIPID PNL WITH DIRECT LDL SERPL: 102 MG/DL — SIGNIFICANT CHANGE UP
MAGNESIUM SERPL-MCNC: 2.1 MG/DL — SIGNIFICANT CHANGE UP (ref 1.6–2.6)
MCHC RBC-ENTMCNC: 29.4 PG — SIGNIFICANT CHANGE UP (ref 27–34)
MCHC RBC-ENTMCNC: 33.3 GM/DL — SIGNIFICANT CHANGE UP (ref 32–36)
MCV RBC AUTO: 88.1 FL — SIGNIFICANT CHANGE UP (ref 80–100)
PHOSPHATE SERPL-MCNC: 2.5 MG/DL — SIGNIFICANT CHANGE UP (ref 2.5–4.5)
PLATELET # BLD AUTO: 180 K/UL — SIGNIFICANT CHANGE UP (ref 150–400)
POTASSIUM SERPL-MCNC: 3.6 MMOL/L — SIGNIFICANT CHANGE UP (ref 3.5–5.3)
POTASSIUM SERPL-SCNC: 3.6 MMOL/L — SIGNIFICANT CHANGE UP (ref 3.5–5.3)
RAPID RVP RESULT: DETECTED
RBC # BLD: 3.84 M/UL — SIGNIFICANT CHANGE UP (ref 3.8–5.2)
RBC # BLD: 3.95 M/UL — SIGNIFICANT CHANGE UP (ref 3.8–5.2)
RBC # FLD: 11.1 % — SIGNIFICANT CHANGE UP (ref 10.3–14.5)
RETICS #: 28.8 K/UL — SIGNIFICANT CHANGE UP (ref 25–125)
RETICS/RBC NFR: 0.8 % — SIGNIFICANT CHANGE UP (ref 0.5–2.5)
SODIUM SERPL-SCNC: 128 MMOL/L — LOW (ref 135–145)
TIBC SERPL-MCNC: 264 UG/DL — SIGNIFICANT CHANGE UP (ref 250–450)
TOTAL CHOLESTEROL/HDL RATIO MEASUREMENT: 2.8 RATIO — LOW (ref 3.3–7.1)
TRIGL SERPL-MCNC: 49 MG/DL — SIGNIFICANT CHANGE UP (ref 10–149)
TSH SERPL-MCNC: 0.9 UU/ML — SIGNIFICANT CHANGE UP (ref 0.34–4.82)
UIBC SERPL-MCNC: 252 UG/DL — SIGNIFICANT CHANGE UP (ref 110–370)
VIT B12 SERPL-MCNC: 983 PG/ML — SIGNIFICANT CHANGE UP (ref 232–1245)
WBC # BLD: 16 K/UL — HIGH (ref 3.8–10.5)
WBC # FLD AUTO: 16 K/UL — HIGH (ref 3.8–10.5)

## 2018-01-30 RX ORDER — INFLUENZA VIRUS VACCINE 15; 15; 15; 15 UG/.5ML; UG/.5ML; UG/.5ML; UG/.5ML
0.5 SUSPENSION INTRAMUSCULAR ONCE
Qty: 0 | Refills: 0 | Status: COMPLETED | OUTPATIENT
Start: 2018-01-30 | End: 2018-01-30

## 2018-01-30 RX ORDER — SODIUM CHLORIDE 9 MG/ML
1000 INJECTION INTRAMUSCULAR; INTRAVENOUS; SUBCUTANEOUS
Qty: 0 | Refills: 0 | Status: DISCONTINUED | OUTPATIENT
Start: 2018-01-30 | End: 2018-02-05

## 2018-01-30 RX ORDER — ACETAMINOPHEN 500 MG
650 TABLET ORAL ONCE
Qty: 0 | Refills: 0 | Status: COMPLETED | OUTPATIENT
Start: 2018-01-30 | End: 2018-01-30

## 2018-01-30 RX ADMIN — Medication 650 MILLIGRAM(S): at 14:21

## 2018-01-30 RX ADMIN — Medication 3 MILLILITER(S): at 14:14

## 2018-01-30 RX ADMIN — CEFTRIAXONE 100 GRAM(S): 500 INJECTION, POWDER, FOR SOLUTION INTRAMUSCULAR; INTRAVENOUS at 21:09

## 2018-01-30 RX ADMIN — ATORVASTATIN CALCIUM 10 MILLIGRAM(S): 80 TABLET, FILM COATED ORAL at 21:09

## 2018-01-30 RX ADMIN — Medication 3 MILLILITER(S): at 03:36

## 2018-01-30 RX ADMIN — SODIUM CHLORIDE 60 MILLILITER(S): 9 INJECTION INTRAMUSCULAR; INTRAVENOUS; SUBCUTANEOUS at 14:15

## 2018-01-30 RX ADMIN — Medication 3 MILLILITER(S): at 10:38

## 2018-01-30 RX ADMIN — Medication 3 MILLILITER(S): at 22:16

## 2018-01-30 RX ADMIN — AZITHROMYCIN 250 MILLIGRAM(S): 500 TABLET, FILM COATED ORAL at 21:09

## 2018-01-30 RX ADMIN — PANTOPRAZOLE SODIUM 40 MILLIGRAM(S): 20 TABLET, DELAYED RELEASE ORAL at 07:04

## 2018-01-30 NOTE — PROGRESS NOTE ADULT - SUBJECTIVE AND OBJECTIVE BOX
INTERVAL HPI/OVERNIGHT EVENTS: no acute events  pt seen and examined, pt improving    VITAL SIGNS:  T(F): 99 (18 @ 09:00)  HR: 65 (18 @ 09:00)  BP: 110/62 (18 @ 09:00)  RR: 18 (18 @ 09:00)  SpO2: 95% (18 @ 09:00)  Wt(kg): --    PHYSICAL EXAM:    Constitutional: NAD  Respiratory: CTAB  Cardiovascular: RRR, S1, S2 present  Gastrointestinal: ND, NT, BS++  Extremities: no pedal edema  Vascular: intact  Neurological: AAOX3  Musculoskeletal: non focal    MEDICATIONS  (STANDING):  ALBUTerol/ipratropium for Nebulization 3 milliLiter(s) Nebulizer every 6 hours  atorvastatin 10 milliGRAM(s) Oral at bedtime  azithromycin  IVPB 500 milliGRAM(s) IV Intermittent every 24 hours  azithromycin  IVPB      cefTRIAXone   IVPB      cefTRIAXone   IVPB 1 Gram(s) IV Intermittent every 24 hours  enoxaparin Injectable 40 milliGRAM(s) SubCutaneous daily  pantoprazole    Tablet 40 milliGRAM(s) Oral before breakfast    MEDICATIONS  (PRN):      Allergies    Motrin (Unknown)    Intolerances    aspirin (Vomiting)      LABS:                        11.6   16.0  )-----------( 180      ( 2018 06:56 )             34.8         128<L>  |  93<L>  |  11  ----------------------------<  125<H>  3.6   |  25  |  0.72    Ca    8.0<L>      2018 06:56  Phos  2.5       Mg     2.1         TPro  7.4  /  Alb  3.6  /  TBili  0.5  /  DBili  x   /  AST  23  /  ALT  20  /  AlkPhos  102      PT/INR - ( 2018 15:42 )   PT: 11.4 sec;   INR: 1.04 ratio         PTT - ( 2018 15:42 )  PTT:26.7 sec  Urinalysis Basic - ( 2018 18:35 )    Color: Yellow / Appearance: Clear / S.005 / pH: x  Gluc: x / Ketone: Trace  / Bili: Negative / Urobili: Negative   Blood: x / Protein: 15 / Nitrite: Negative   Leuk Esterase: Negative / RBC: 2-5 /HPF / WBC 0-2 /HPF   Sq Epi: x / Non Sq Epi: Few /HPF / Bacteria: Few /HPF        RADIOLOGY & ADDITIONAL TESTS:  < from: CT Head No Cont (18 @ 17:18) >  No acute intracranial hemorrhage or acute territorial infarct.    Probable small meningioma left temporal region. Confirmation recommended   with MRI of the brain with contrast    Air-fluid levels in the maxillary sinuses which may represent component   of acute sinusitis. Correlate clinically.    < end of copied text >    < from: CT Chest No Cont (18 @ 20:44) >  Bilateral airspace opacities as described above which may be infectious   in etiology in the correct clinical setting. Correlate clinically and   follow-up recommended.    < end of copied text >

## 2018-01-30 NOTE — PATIENT PROFILE ADULT. - NS TRANSFER PATIENT BELONGINGS
Clothing 1 pair of silver earrings, other belongings family took home as per pt/Cell Phone/PDA (specify)

## 2018-01-30 NOTE — PROGRESS NOTE ADULT - ASSESSMENT
82F from home, lives alone PMH LBP w/ disc disease, Migraines who comes to the hospital for weakness x1 day. Patient has systemic weakness and is mildly warm to touch, however, no mai sick contacts - only daughter had slight cough, admitted for  b/l	Pneumonia and corona virus illness  patient also hyponatremic likely secondary to hctz sporadic use     Problem/Plan - 1:  ·  Problem: Weakness.  Plan: Pt has b/l Pneumonia and corona virus illness-will get CT chest for further evaluation  - on rocephin/adriano mcleod     Problem/Plan - 2:  ·  Problem: Hyponatremia.  Plan: patient has hyponatremia of 128 in the setting of prescription of HCTZ  -however patient denies using it daily and PMD also thinks patient is noncompliant  -will get rehydration  Na 129 today     Problem/Plan - 3:  ·  Problem: Need for prophylactic measure.  Plan: IMPROVE score = 2, will give chem dvt ppx  no indication for gi ppx at this time.

## 2018-01-31 ENCOUNTER — TRANSCRIPTION ENCOUNTER (OUTPATIENT)
Age: 83
End: 2018-01-31

## 2018-01-31 LAB
ALBUMIN SERPL ELPH-MCNC: 2.7 G/DL — LOW (ref 3.5–5)
ALP SERPL-CCNC: 77 U/L — SIGNIFICANT CHANGE UP (ref 40–120)
ALT FLD-CCNC: 18 U/L DA — SIGNIFICANT CHANGE UP (ref 10–60)
ANION GAP SERPL CALC-SCNC: 8 MMOL/L — SIGNIFICANT CHANGE UP (ref 5–17)
AST SERPL-CCNC: 13 U/L — SIGNIFICANT CHANGE UP (ref 10–40)
BASOPHILS # BLD AUTO: 0.1 K/UL — SIGNIFICANT CHANGE UP (ref 0–0.2)
BASOPHILS NFR BLD AUTO: 0.5 % — SIGNIFICANT CHANGE UP (ref 0–2)
BILIRUB SERPL-MCNC: 0.4 MG/DL — SIGNIFICANT CHANGE UP (ref 0.2–1.2)
BUN SERPL-MCNC: 11 MG/DL — SIGNIFICANT CHANGE UP (ref 7–18)
CALCIUM SERPL-MCNC: 8.2 MG/DL — LOW (ref 8.4–10.5)
CHLORIDE SERPL-SCNC: 102 MMOL/L — SIGNIFICANT CHANGE UP (ref 96–108)
CO2 SERPL-SCNC: 25 MMOL/L — SIGNIFICANT CHANGE UP (ref 22–31)
CREAT SERPL-MCNC: 0.53 MG/DL — SIGNIFICANT CHANGE UP (ref 0.5–1.3)
CULTURE RESULTS: SIGNIFICANT CHANGE UP
EOSINOPHIL # BLD AUTO: 0 K/UL — SIGNIFICANT CHANGE UP (ref 0–0.5)
EOSINOPHIL NFR BLD AUTO: 0.1 % — SIGNIFICANT CHANGE UP (ref 0–6)
GLUCOSE SERPL-MCNC: 124 MG/DL — HIGH (ref 70–99)
HCT VFR BLD CALC: 34.8 % — SIGNIFICANT CHANGE UP (ref 34.5–45)
HGB BLD-MCNC: 11.4 G/DL — LOW (ref 11.5–15.5)
LYMPHOCYTES # BLD AUTO: 1.2 K/UL — SIGNIFICANT CHANGE UP (ref 1–3.3)
LYMPHOCYTES # BLD AUTO: 8.7 % — LOW (ref 13–44)
MAGNESIUM SERPL-MCNC: 2.2 MG/DL — SIGNIFICANT CHANGE UP (ref 1.6–2.6)
MCHC RBC-ENTMCNC: 29 PG — SIGNIFICANT CHANGE UP (ref 27–34)
MCHC RBC-ENTMCNC: 32.8 GM/DL — SIGNIFICANT CHANGE UP (ref 32–36)
MCV RBC AUTO: 88.4 FL — SIGNIFICANT CHANGE UP (ref 80–100)
MONOCYTES # BLD AUTO: 0.6 K/UL — SIGNIFICANT CHANGE UP (ref 0–0.9)
MONOCYTES NFR BLD AUTO: 4.3 % — SIGNIFICANT CHANGE UP (ref 2–14)
NEUTROPHILS # BLD AUTO: 11.8 K/UL — HIGH (ref 1.8–7.4)
NEUTROPHILS NFR BLD AUTO: 86.4 % — HIGH (ref 43–77)
PHOSPHATE SERPL-MCNC: 2 MG/DL — LOW (ref 2.5–4.5)
PLATELET # BLD AUTO: 175 K/UL — SIGNIFICANT CHANGE UP (ref 150–400)
POTASSIUM SERPL-MCNC: 4 MMOL/L — SIGNIFICANT CHANGE UP (ref 3.5–5.3)
POTASSIUM SERPL-SCNC: 4 MMOL/L — SIGNIFICANT CHANGE UP (ref 3.5–5.3)
PROT SERPL-MCNC: 6.2 G/DL — SIGNIFICANT CHANGE UP (ref 6–8.3)
RBC # BLD: 3.94 M/UL — SIGNIFICANT CHANGE UP (ref 3.8–5.2)
RBC # FLD: 11.3 % — SIGNIFICANT CHANGE UP (ref 10.3–14.5)
SODIUM SERPL-SCNC: 135 MMOL/L — SIGNIFICANT CHANGE UP (ref 135–145)
SPECIMEN SOURCE: SIGNIFICANT CHANGE UP
WBC # BLD: 13.7 K/UL — HIGH (ref 3.8–10.5)
WBC # FLD AUTO: 13.7 K/UL — HIGH (ref 3.8–10.5)

## 2018-01-31 RX ORDER — ACETAMINOPHEN 500 MG
650 TABLET ORAL EVERY 6 HOURS
Qty: 0 | Refills: 0 | Status: DISCONTINUED | OUTPATIENT
Start: 2018-01-31 | End: 2018-02-05

## 2018-01-31 RX ORDER — ACETAMINOPHEN 500 MG
650 TABLET ORAL ONCE
Qty: 0 | Refills: 0 | Status: COMPLETED | OUTPATIENT
Start: 2018-01-31 | End: 2018-01-31

## 2018-01-31 RX ORDER — LOSARTAN POTASSIUM 100 MG/1
25 TABLET, FILM COATED ORAL DAILY
Qty: 0 | Refills: 0 | Status: DISCONTINUED | OUTPATIENT
Start: 2018-01-31 | End: 2018-02-02

## 2018-01-31 RX ORDER — LIDOCAINE 4 G/100G
1 CREAM TOPICAL DAILY
Qty: 0 | Refills: 0 | Status: DISCONTINUED | OUTPATIENT
Start: 2018-01-31 | End: 2018-02-05

## 2018-01-31 RX ORDER — LOSARTAN POTASSIUM 100 MG/1
50 TABLET, FILM COATED ORAL DAILY
Qty: 0 | Refills: 0 | Status: DISCONTINUED | OUTPATIENT
Start: 2018-01-31 | End: 2018-01-31

## 2018-01-31 RX ADMIN — Medication 650 MILLIGRAM(S): at 22:15

## 2018-01-31 RX ADMIN — ENOXAPARIN SODIUM 40 MILLIGRAM(S): 100 INJECTION SUBCUTANEOUS at 11:24

## 2018-01-31 RX ADMIN — Medication 100 MILLIGRAM(S): at 22:15

## 2018-01-31 RX ADMIN — Medication 100 MILLIGRAM(S): at 17:52

## 2018-01-31 RX ADMIN — Medication 3 MILLILITER(S): at 20:04

## 2018-01-31 RX ADMIN — SODIUM CHLORIDE 60 MILLILITER(S): 9 INJECTION INTRAMUSCULAR; INTRAVENOUS; SUBCUTANEOUS at 04:41

## 2018-01-31 RX ADMIN — AZITHROMYCIN 250 MILLIGRAM(S): 500 TABLET, FILM COATED ORAL at 19:53

## 2018-01-31 RX ADMIN — Medication 3 MILLILITER(S): at 14:55

## 2018-01-31 RX ADMIN — PANTOPRAZOLE SODIUM 40 MILLIGRAM(S): 20 TABLET, DELAYED RELEASE ORAL at 04:40

## 2018-01-31 RX ADMIN — Medication 62.5 MILLIMOLE(S): at 10:07

## 2018-01-31 RX ADMIN — Medication 650 MILLIGRAM(S): at 22:50

## 2018-01-31 RX ADMIN — Medication 100 MILLIGRAM(S): at 04:41

## 2018-01-31 RX ADMIN — LIDOCAINE 1 PATCH: 4 CREAM TOPICAL at 11:24

## 2018-01-31 RX ADMIN — Medication 1 DROP(S): at 17:51

## 2018-01-31 RX ADMIN — CEFTRIAXONE 100 GRAM(S): 500 INJECTION, POWDER, FOR SOLUTION INTRAMUSCULAR; INTRAVENOUS at 22:15

## 2018-01-31 RX ADMIN — ATORVASTATIN CALCIUM 10 MILLIGRAM(S): 80 TABLET, FILM COATED ORAL at 22:29

## 2018-01-31 RX ADMIN — Medication 3 MILLILITER(S): at 09:12

## 2018-01-31 RX ADMIN — SODIUM CHLORIDE 60 MILLILITER(S): 9 INJECTION INTRAMUSCULAR; INTRAVENOUS; SUBCUTANEOUS at 10:08

## 2018-01-31 RX ADMIN — Medication 100 MILLIGRAM(S): at 22:29

## 2018-01-31 NOTE — PROGRESS NOTE ADULT - ASSESSMENT
82F from home, lives alone PMH LBP w/ disc disease, Migraines who comes to the hospital for weakness x1 day. Patient has systemic weakness and is mildly warm to touch, however, no mai sick contacts - only daughter had slight cough, admitted for  b/l	Pneumonia and corona virus illness  patient also hyponatremic likely secondary to hctz sporadic use     Problem/Plan - 1:  ·  Problem: Weakness.  Plan: Pt has b/l Pneumonia and corona virus illness-  -CT chest showed Bilateral airspace opacities as described above which may be infectious in etiology in the correct clinical setting.  - on rocephin/adriano mcleod     Problem/Plan - 2:  ·  Problem: Hyponatremia.  Plan: resolved  patient had hyponatremia of 128 in the setting of use of HCTZ  -s/p rehydration  Na 135 today     Problem/Plan - 3:  ·  Problem: Need for prophylactic measure.  Plan: IMPROVE score = 2, will give chem dvt ppx  no indication for gi ppx at this time.

## 2018-01-31 NOTE — DISCHARGE NOTE ADULT - HOSPITAL COURSE
82F from home, lives alone PMH LBP w/ disc disease, Migraines who comes to the hospital for weakness x1 day. Patient has systemic weakness and is mildly warm to touch, however, no mai sick contacts - only daughter had slight cough, admitted for  b/l	Pneumonia and corona virus illness  patient also hyponatremic likely secondary to hctz sporadic use    Pt has b/l Pneumonia and corona virus illness-  -CT chest showed Bilateral airspace opacities as described above which may be infectious in etiology in the correct clinical setting.  - was treated with IV abx rocephin/azithro  -duonebs      2.  Hyponatremia.  resolved now  patient had hyponatremia of 128 in the setting of use of HCTZ  -s/p rehydration    Pt is stable to be discharged home as per Attending 82F from home, lives alone PMH LBP w/ disc disease, Migraines who comes to the hospital for weakness x1 day. Patient has systemic weakness and is mildly warm to touch, however, no mai sick contacts - only daughter had slight cough, admitted for  b/l	Pneumonia and corona virus illness  patient also hyponatremic likely secondary to hctz sporadic use    Pt has b/l Pneumonia and corona virus illness-  -CT chest showed Bilateral airspace opacities as described above which may be infectious in etiology in the correct clinical setting.  - was treated with IV abx rocephin/azithro and discharging on cefuroxime for 2 more days to finish 10 days total abx      2.  Hyponatremia.  resolved now  patient had hyponatremia of 128 in the setting of use of HCTZ  -s/p rehydration    Pt is stable to be discharged home as per Attending 82F from home, lives alone PMH LBP w/ disc disease, Migraines who comes to the hospital for weakness x1 day. Patient has systemic weakness and is mildly warm to touch, however, no mai sick contacts - only daughter had slight cough, admitted for  b/l	Pneumonia and corona virus illness  patient also hyponatremic likely secondary to hctz sporadic use    Pt has b/l Pneumonia and corona virus illness-  -CT chest showed Bilateral airspace opacities as described above which may be infectious in etiology in the correct clinical setting.  - was treated with IV abx rocephin/azithro and discharging on cefuroxime for 2 more days to finish 10 days total abx      2.  Hyponatremia.  resolved now  patient had hyponatremia of 128 in the setting of use of HCTZ  -s/p rehydration  -D/toro HCTZ and added norvasc 2.5 mg as BP high. c/w losartan    Pt is stable to be discharged home as per Attending

## 2018-01-31 NOTE — DISCHARGE NOTE ADULT - CARE PLAN
Principal Discharge DX:	Pneumonia due to infectious organism, unspecified laterality, unspecified part of lung  Goal:	to treat it  Assessment and plan of treatment:	continue current meds and f/u with PMD within 1 week  Secondary Diagnosis:	Hyponatremia  Secondary Diagnosis:	Migraine without status migrainosus, not intractable, unspecified migraine type

## 2018-01-31 NOTE — PROGRESS NOTE ADULT - SUBJECTIVE AND OBJECTIVE BOX
INTERVAL HPI/OVERNIGHT EVENTS: no acute events  pt seen and examined, pt SOB improving, but still coughing.    VITAL SIGNS:  Vital Signs Last 24 Hrs  T(C): 37.1 (2018 05:07), Max: 37.3 (2018 14:38)  T(F): 98.8 (2018 05:07), Max: 99.2 (2018 14:38)  HR: 66 (2018 05:07) (66 - 76)  BP: 139/79 (2018 05:07) (114/73 - 139/79)  BP(mean): --  RR: 16 (2018 05:07) (16 - 18)  SpO2: 95% (2018 05:07) (94% - 95%)    PHYSICAL EXAM:    Constitutional: NAD  Respiratory: CTAB  Cardiovascular: RRR, S1, S2 present  Gastrointestinal: ND, NT, BS++  Extremities: no pedal edema  Vascular: intact  Neurological: AAOX3  Musculoskeletal: non focal    MEDICATIONS  (STANDING):  ALBUTerol/ipratropium for Nebulization 3 milliLiter(s) Nebulizer every 6 hours  atorvastatin 10 milliGRAM(s) Oral at bedtime  azithromycin  IVPB 500 milliGRAM(s) IV Intermittent every 24 hours  azithromycin  IVPB      cefTRIAXone   IVPB      cefTRIAXone   IVPB 1 Gram(s) IV Intermittent every 24 hours  enoxaparin Injectable 40 milliGRAM(s) SubCutaneous daily  pantoprazole    Tablet 40 milliGRAM(s) Oral before breakfast    MEDICATIONS  (PRN):      Allergies    Motrin (Unknown)    Intolerances    aspirin (Vomiting)      LABS:                        11.6   16.0  )-----------( 180      ( 2018 06:56 )             34.8     Complete Blood Count + Automated Diff in AM (18 @ 06:04)    WBC Count: 13.7 K/uL    RBC Count: 3.94 M/uL    Hemoglobin: 11.4 g/dL    Hematocrit: 34.8 %    Mean Cell Volume: 88.4 fl    Mean Cell Hemoglobin: 29.0 pg    Mean Cell Hemoglobin Conc: 32.8 gm/dL    Red Cell Distrib Width: 11.3 %    Platelet Count - Automated: 175 K/uL    Auto Neutrophil #: 11.8 K/uL    Auto Lymphocyte #: 1.2 K/uL    Auto Monocyte #: 0.6 K/uL    Auto Eosinophil #: 0.0 K/uL    Auto Basophil #: 0.1 K/uL    Auto Neutrophil %: 86.4: Differential percentages must be correlated with absolute numbers for  clinical significance. %    Auto Lymphocyte %: 8.7 %    Auto Monocyte %: 4.3 %    Auto Eosinophil %: 0.1 %    Auto Basophil %: 0.5 %    Comprehensive Metabolic Panel in AM (18 @ 06:04)    Sodium, Serum: 135 mmol/L    Potassium, Serum: 4.0 mmol/L    Chloride, Serum: 102 mmol/L    Carbon Dioxide, Serum: 25 mmol/L    Anion Gap, Serum: 8 mmol/L    Blood Urea Nitrogen, Serum: 11 mg/dL    Creatinine, Serum: 0.53 mg/dL    Glucose, Serum: 124 mg/dL    Calcium, Total Serum: 8.2 mg/dL    Protein Total, Serum: 6.2 g/dL    Albumin, Serum: 2.7 g/dL    Bilirubin Total, Serum: 0.4 mg/dL    Alkaline Phosphatase, Serum: 77 U/L    Aspartate Aminotransferase (AST/SGOT): 13 U/L    Alanine Aminotransferase (ALT/SGPT): 18 U/L DA    eGFR if Non : 90: Interpretative comment  The units for eGFR are ml/min/1.73m2 (normalized body surface area). The  eGFR is calculated from a serum creatinine using the CKD-EPI equation.  Other variables required for calculation are race, age and sex. Among  patients with chronic kidney disease (CKD), the eGFR is useful in  determining the stage of disease according to KDOQI CKD classification.  All eGFR results are reported numerically with the following  interpretation.          GFR                    With                 Without     (ml/min/1.73 m2)    Kidney Damage       Kidney Damage        >= 90                    Stage 1                     Normal        60-89                    Stage 2                     Decreased GFR        30-59     Stage 3                     Stage 3        15-29                    Stage 4                     Stage 4        < 15                      Stage 5                     Stage 5  Each stage of CKD assumes that the associated GFR level has been in  effect for at least 3 months. Determination of stages one and two (with  eGFR > 59 ml/min/m2) requires estimation of kidney damage for at least 3  months as defined by structural or functional abnormalities.  Limitations: All estimates of GFR will be less accurate for patients at  extremes of muscle mass (including but not limited to frail elderly,  critically ill, or cancer patients), those with unusual diets, and those  with conditions associated with reduced secretion or extrarenal  elimination of creatinine. The eGFR equation is not recommended for use  in patients with unstable creatinine levels. mL/min/1.73M2    eGFR if African American: 104 mL/min/1.73M2           PTT - ( 2018 15:42 )  PTT:26.7 sec  Urinalysis Basic - ( 2018 18:35 )    Color: Yellow / Appearance: Clear / S.005 / pH: x  Gluc: x / Ketone: Trace  / Bili: Negative / Urobili: Negative   Blood: x / Protein: 15 / Nitrite: Negative   Leuk Esterase: Negative / RBC: 2-5 /HPF / WBC 0-2 /HPF   Sq Epi: x / Non Sq Epi: Few /HPF / Bacteria: Few /HPF        RADIOLOGY & ADDITIONAL TESTS:  < from: CT Head No Cont (18 @ 17:18) >  No acute intracranial hemorrhage or acute territorial infarct.    Probable small meningioma left temporal region. Confirmation recommended   with MRI of the brain with contrast    Air-fluid levels in the maxillary sinuses which may represent component   of acute sinusitis. Correlate clinically.    < end of copied text >    < from: CT Chest No Cont (18 @ 20:44) >  Bilateral airspace opacities as described above which may be infectious   in etiology in the correct clinical setting. Correlate clinically and   follow-up recommended.    < end of copied text >

## 2018-01-31 NOTE — DISCHARGE NOTE ADULT - MEDICATION SUMMARY - MEDICATIONS TO TAKE
I will START or STAY ON the medications listed below when I get home from the hospital:    Lipitor 10 mg oral tablet  -- 1 tab(s) by mouth once a day  -- Indication: For Need for prophylactic measure    losartan-hydrochlorothiazide 50mg-12.5mg oral tablet  -- 1 tab(s) by mouth once a day  -- Indication: For HTN    benzonatate 100 mg oral capsule  -- 1 cap(s) by mouth 4 times a day, As needed, Cough  -- Indication: For cough    VENTOLIN HFA AER  -- Indication: For sob    cefuroxime 500 mg oral tablet  -- 1 tab(s) by mouth every 12 hours  -- Indication: For Pneumonia due to infectious organism, unspecified laterality, unspecified part of lung    guaiFENesin 100 mg/5 mL oral liquid  -- 5 milliliter(s) by mouth every 6 hours, As needed, Cough  -- Indication: For cough    Protonix 40 mg oral delayed release tablet  -- 1 tab(s) by mouth once a day  -- Indication: For Need for prophylactic measure I will START or STAY ON the medications listed below when I get home from the hospital:    losartan 50 mg oral tablet  -- 1 tab(s) by mouth once a day  -- Indication: For HTN    Lipitor 10 mg oral tablet  -- 1 tab(s) by mouth once a day  -- Indication: For Need for prophylactic measure    benzonatate 100 mg oral capsule  -- 1 cap(s) by mouth 4 times a day, As needed, Cough  -- Indication: For cough    VENTOLIN HFA AER  -- Indication: For sob    Norvasc 2.5 mg oral tablet  -- 1 tab(s) by mouth once a day  -- Indication: For HTN    cefuroxime 500 mg oral tablet  -- 1 tab(s) by mouth every 12 hours  -- Indication: For Pneumonia due to infectious organism, unspecified laterality, unspecified part of lung    guaiFENesin 100 mg/5 mL oral liquid  -- 5 milliliter(s) by mouth every 6 hours, As needed, Cough  -- Indication: For cough    Protonix 40 mg oral delayed release tablet  -- 1 tab(s) by mouth once a day  -- Indication: For Need for prophylactic measure

## 2018-01-31 NOTE — DISCHARGE NOTE ADULT - PATIENT PORTAL LINK FT
“You can access the [facility] Patient Portal, offered by [facility], by registering with the following website: [email or web address]”

## 2018-02-01 LAB
ANION GAP SERPL CALC-SCNC: 7 MMOL/L — SIGNIFICANT CHANGE UP (ref 5–17)
BUN SERPL-MCNC: 10 MG/DL — SIGNIFICANT CHANGE UP (ref 7–18)
CALCIUM SERPL-MCNC: 8.7 MG/DL — SIGNIFICANT CHANGE UP (ref 8.4–10.5)
CHLORIDE SERPL-SCNC: 102 MMOL/L — SIGNIFICANT CHANGE UP (ref 96–108)
CO2 SERPL-SCNC: 26 MMOL/L — SIGNIFICANT CHANGE UP (ref 22–31)
CREAT SERPL-MCNC: 0.65 MG/DL — SIGNIFICANT CHANGE UP (ref 0.5–1.3)
GLUCOSE SERPL-MCNC: 161 MG/DL — HIGH (ref 70–99)
HCT VFR BLD CALC: 34.3 % — LOW (ref 34.5–45)
HGB BLD-MCNC: 11.6 G/DL — SIGNIFICANT CHANGE UP (ref 11.5–15.5)
MAGNESIUM SERPL-MCNC: 2.2 MG/DL — SIGNIFICANT CHANGE UP (ref 1.6–2.6)
MCHC RBC-ENTMCNC: 30.8 PG — SIGNIFICANT CHANGE UP (ref 27–34)
MCHC RBC-ENTMCNC: 34 GM/DL — SIGNIFICANT CHANGE UP (ref 32–36)
MCV RBC AUTO: 90.6 FL — SIGNIFICANT CHANGE UP (ref 80–100)
PHOSPHATE SERPL-MCNC: 2.7 MG/DL — SIGNIFICANT CHANGE UP (ref 2.5–4.5)
PLATELET # BLD AUTO: 201 K/UL — SIGNIFICANT CHANGE UP (ref 150–400)
POTASSIUM SERPL-MCNC: 4.8 MMOL/L — SIGNIFICANT CHANGE UP (ref 3.5–5.3)
POTASSIUM SERPL-SCNC: 4.8 MMOL/L — SIGNIFICANT CHANGE UP (ref 3.5–5.3)
RBC # BLD: 3.79 M/UL — LOW (ref 3.8–5.2)
RBC # FLD: 11.4 % — SIGNIFICANT CHANGE UP (ref 10.3–14.5)
SODIUM SERPL-SCNC: 135 MMOL/L — SIGNIFICANT CHANGE UP (ref 135–145)
WBC # BLD: 8 K/UL — SIGNIFICANT CHANGE UP (ref 3.8–10.5)
WBC # FLD AUTO: 8 K/UL — SIGNIFICANT CHANGE UP (ref 3.8–10.5)

## 2018-02-01 RX ADMIN — Medication 3 MILLILITER(S): at 08:46

## 2018-02-01 RX ADMIN — Medication 650 MILLIGRAM(S): at 12:00

## 2018-02-01 RX ADMIN — Medication 3 MILLILITER(S): at 15:08

## 2018-02-01 RX ADMIN — Medication 100 MILLIGRAM(S): at 11:32

## 2018-02-01 RX ADMIN — LIDOCAINE 1 PATCH: 4 CREAM TOPICAL at 11:35

## 2018-02-01 RX ADMIN — ATORVASTATIN CALCIUM 10 MILLIGRAM(S): 80 TABLET, FILM COATED ORAL at 21:00

## 2018-02-01 RX ADMIN — CEFTRIAXONE 100 GRAM(S): 500 INJECTION, POWDER, FOR SOLUTION INTRAMUSCULAR; INTRAVENOUS at 20:43

## 2018-02-01 RX ADMIN — AZITHROMYCIN 250 MILLIGRAM(S): 500 TABLET, FILM COATED ORAL at 20:42

## 2018-02-01 RX ADMIN — Medication 100 MILLIGRAM(S): at 11:33

## 2018-02-01 RX ADMIN — LOSARTAN POTASSIUM 25 MILLIGRAM(S): 100 TABLET, FILM COATED ORAL at 05:39

## 2018-02-01 RX ADMIN — PANTOPRAZOLE SODIUM 40 MILLIGRAM(S): 20 TABLET, DELAYED RELEASE ORAL at 05:39

## 2018-02-01 RX ADMIN — Medication 100 MILLIGRAM(S): at 20:42

## 2018-02-01 RX ADMIN — Medication 100 MILLIGRAM(S): at 05:38

## 2018-02-01 RX ADMIN — LIDOCAINE 1 PATCH: 4 CREAM TOPICAL at 00:48

## 2018-02-01 RX ADMIN — Medication 3 MILLILITER(S): at 20:41

## 2018-02-01 RX ADMIN — ENOXAPARIN SODIUM 40 MILLIGRAM(S): 100 INJECTION SUBCUTANEOUS at 11:32

## 2018-02-01 RX ADMIN — LIDOCAINE 1 PATCH: 4 CREAM TOPICAL at 23:38

## 2018-02-01 RX ADMIN — Medication 650 MILLIGRAM(S): at 11:33

## 2018-02-01 NOTE — PROGRESS NOTE ADULT - SUBJECTIVE AND OBJECTIVE BOX
INTERVAL HPI/OVERNIGHT EVENTS: no acute events  pt seen and examined, pt SOB improving, but still coughing and pt is anxious and says she feels nervous and weak.    VITAL SIGNS:  Vital Signs Last 24 Hrs  T(C): 36.8 (01 Feb 2018 05:15), Max: 37.3 (31 Jan 2018 14:40)  T(F): 98.3 (01 Feb 2018 05:15), Max: 99.2 (31 Jan 2018 14:40)  HR: 64 (01 Feb 2018 05:15) (64 - 82)  BP: 164/82 (01 Feb 2018 05:15) (146/73 - 167/74)  BP(mean): --  RR: 16 (01 Feb 2018 05:15) (16 - 17)  SpO2: 95% (01 Feb 2018 05:15) (95% - 97%)    PHYSICAL EXAM:    Constitutional: NAD  Respiratory: CTAB  Cardiovascular: RRR, S1, S2 present  Gastrointestinal: ND, NT, BS++  Extremities: no pedal edema  Vascular: intact  Neurological: AAOX3  Musculoskeletal: non focal    MEDICATIONS  (STANDING):  ALBUTerol/ipratropium for Nebulization 3 milliLiter(s) Nebulizer every 6 hours  atorvastatin 10 milliGRAM(s) Oral at bedtime  azithromycin  IVPB 500 milliGRAM(s) IV Intermittent every 24 hours  azithromycin  IVPB      cefTRIAXone   IVPB      cefTRIAXone   IVPB 1 Gram(s) IV Intermittent every 24 hours  enoxaparin Injectable 40 milliGRAM(s) SubCutaneous daily  pantoprazole    Tablet 40 milliGRAM(s) Oral before breakfast    MEDICATIONS  (PRN):      Allergies    Motrin (Unknown)    Intolerances    aspirin (Vomiting)      LABS:             Complete Blood Count (02.01.18 @ 07:57)    WBC Count: 8.0: Test repeated. K/uL    RBC Count: 3.79 M/uL    Hemoglobin: 11.6 g/dL    Hematocrit: 34.3 %    Mean Cell Volume: 90.6 fl    Mean Cell Hemoglobin: 30.8 pg    Mean Cell Hemoglobin Conc: 34.0 gm/dL    Red Cell Distrib Width: 11.4 %    Platelet Count - Automated: 201 K/uL    Basic Metabolic Panel (02.01.18 @ 07:57)    Sodium, Serum: 135 mmol/L    Potassium, Serum: 4.8 mmol/L    Chloride, Serum: 102 mmol/L    Carbon Dioxide, Serum: 26 mmol/L    Anion Gap, Serum: 7 mmol/L    Blood Urea Nitrogen, Serum: 10 mg/dL    Creatinine, Serum: 0.65 mg/dL    Glucose, Serum: 161 mg/dL    Calcium, Total Serum: 8.7 mg/dL    eGFR if Non : 84: Interpretative comment  The units for eGFR are ml/min/1.73m2 (normalized body surface area). The  eGFR is calculated from a serum creatinine using the CKD-EPI equation.  Other variables required for calculation are race, age and sex. Among  patients with chronic kidney disease (CKD), the eGFR is useful in  determining the stage of disease according to KDOQI CKD classification.  All eGFR results are reported numerically with the following  interpretation.          GFR                    With                 Without     (ml/min/1.73 m2)    Kidney Damage       Kidney Damage        >= 90                    Stage 1                     Normal        60-89                    Stage 2                     Decreased GFR        30-59     Stage 3                     Stage 3        15-29                    Stage 4                     Stage 4        < 15                      Stage 5                     Stage 5  Each stage of CKD assumes that the associated GFR level has been in  effect for at least 3 months. Determination of stages one and two (with  eGFR > 59 ml/min/m2) requires estimation of kidney damage for at least 3  months as defined by structural or functional abnormalities.  Limitations: All estimates of GFR will be less accurate for patients at  extremes of muscle mass (including but not limited to frail elderly,  critically ill, or cancer patients), those with unusual diets, and those  with conditions associated with reduced secretion or extrarenal  elimination of creatinine. The eGFR equation is not recommended for use  in patients with unstable creatinine levels. mL/min/1.73M2    eGFR if African American: 97 mL/min/1.73M2          RADIOLOGY & ADDITIONAL TESTS:  < from: CT Head No Cont (01.29.18 @ 17:18) >  No acute intracranial hemorrhage or acute territorial infarct.    Probable small meningioma left temporal region. Confirmation recommended   with MRI of the brain with contrast    Air-fluid levels in the maxillary sinuses which may represent component   of acute sinusitis. Correlate clinically.    < end of copied text >    < from: CT Chest No Cont (01.29.18 @ 20:44) >  Bilateral airspace opacities as described above which may be infectious   in etiology in the correct clinical setting. Correlate clinically and   follow-up recommended.    < end of copied text >

## 2018-02-01 NOTE — PROGRESS NOTE ADULT - ASSESSMENT
82F from home, lives alone PMH LBP w/ disc disease, Migraines who comes to the hospital for weakness x1 day. Patient has systemic weakness and is mildly warm to touch, however, no mai sick contacts - only daughter had slight cough, admitted for  b/l Pneumonia and corona virus illness  patient also hyponatremic likely secondary to hctz sporadic use     Problem/Plan - 1:  ·  Problem: Weakness.  Plan: Pt has b/l Pneumonia and corona virus illness-  -CT chest showed Bilateral airspace opacities as described above which may be infectious in etiology in the correct clinical setting.  - on rocephin/adriano  -duonebs  -D/c planning in AM if stable     Problem/Plan - 2:  ·  Problem: Hyponatremia.  Plan: resolved  patient had hyponatremia of 128 in the setting of use of HCTZ  -s/p rehydration  Na 135 today     Problem/Plan - 3:  ·  Problem: Need for prophylactic measure.  Plan: IMPROVE score = 2, will give chem dvt ppx  no indication for gi ppx at this time.

## 2018-02-02 LAB
ANION GAP SERPL CALC-SCNC: 7 MMOL/L — SIGNIFICANT CHANGE UP (ref 5–17)
BUN SERPL-MCNC: 12 MG/DL — SIGNIFICANT CHANGE UP (ref 7–18)
CALCIUM SERPL-MCNC: 8.8 MG/DL — SIGNIFICANT CHANGE UP (ref 8.4–10.5)
CHLORIDE SERPL-SCNC: 102 MMOL/L — SIGNIFICANT CHANGE UP (ref 96–108)
CO2 SERPL-SCNC: 25 MMOL/L — SIGNIFICANT CHANGE UP (ref 22–31)
CREAT SERPL-MCNC: 0.67 MG/DL — SIGNIFICANT CHANGE UP (ref 0.5–1.3)
GLUCOSE SERPL-MCNC: 92 MG/DL — SIGNIFICANT CHANGE UP (ref 70–99)
HCT VFR BLD CALC: 35.4 % — SIGNIFICANT CHANGE UP (ref 34.5–45)
HGB BLD-MCNC: 11.8 G/DL — SIGNIFICANT CHANGE UP (ref 11.5–15.5)
MAGNESIUM SERPL-MCNC: 2.2 MG/DL — SIGNIFICANT CHANGE UP (ref 1.6–2.6)
MCHC RBC-ENTMCNC: 30.1 PG — SIGNIFICANT CHANGE UP (ref 27–34)
MCHC RBC-ENTMCNC: 33.5 GM/DL — SIGNIFICANT CHANGE UP (ref 32–36)
MCV RBC AUTO: 90 FL — SIGNIFICANT CHANGE UP (ref 80–100)
PHOSPHATE SERPL-MCNC: 3.9 MG/DL — SIGNIFICANT CHANGE UP (ref 2.5–4.5)
PLATELET # BLD AUTO: 247 K/UL — SIGNIFICANT CHANGE UP (ref 150–400)
POTASSIUM SERPL-MCNC: 4.8 MMOL/L — SIGNIFICANT CHANGE UP (ref 3.5–5.3)
POTASSIUM SERPL-SCNC: 4.8 MMOL/L — SIGNIFICANT CHANGE UP (ref 3.5–5.3)
RBC # BLD: 3.94 M/UL — SIGNIFICANT CHANGE UP (ref 3.8–5.2)
RBC # FLD: 11.2 % — SIGNIFICANT CHANGE UP (ref 10.3–14.5)
SODIUM SERPL-SCNC: 134 MMOL/L — LOW (ref 135–145)
WBC # BLD: 5 K/UL — SIGNIFICANT CHANGE UP (ref 3.8–10.5)
WBC # FLD AUTO: 5 K/UL — SIGNIFICANT CHANGE UP (ref 3.8–10.5)

## 2018-02-02 RX ORDER — PANTOPRAZOLE SODIUM 20 MG/1
40 TABLET, DELAYED RELEASE ORAL
Qty: 0 | Refills: 0 | Status: DISCONTINUED | OUTPATIENT
Start: 2018-02-02 | End: 2018-02-05

## 2018-02-02 RX ORDER — PANTOPRAZOLE SODIUM 20 MG/1
40 TABLET, DELAYED RELEASE ORAL
Qty: 0 | Refills: 0 | Status: DISCONTINUED | OUTPATIENT
Start: 2018-02-02 | End: 2018-02-02

## 2018-02-02 RX ORDER — LOSARTAN POTASSIUM 100 MG/1
25 TABLET, FILM COATED ORAL ONCE
Qty: 0 | Refills: 0 | Status: COMPLETED | OUTPATIENT
Start: 2018-02-02 | End: 2018-02-02

## 2018-02-02 RX ORDER — ACETAMINOPHEN 500 MG
650 TABLET ORAL ONCE
Qty: 0 | Refills: 0 | Status: COMPLETED | OUTPATIENT
Start: 2018-02-02 | End: 2018-02-02

## 2018-02-02 RX ORDER — LOSARTAN POTASSIUM 100 MG/1
50 TABLET, FILM COATED ORAL DAILY
Qty: 0 | Refills: 0 | Status: DISCONTINUED | OUTPATIENT
Start: 2018-02-03 | End: 2018-02-05

## 2018-02-02 RX ADMIN — PANTOPRAZOLE SODIUM 40 MILLIGRAM(S): 20 TABLET, DELAYED RELEASE ORAL at 13:43

## 2018-02-02 RX ADMIN — LOSARTAN POTASSIUM 25 MILLIGRAM(S): 100 TABLET, FILM COATED ORAL at 05:09

## 2018-02-02 RX ADMIN — AZITHROMYCIN 250 MILLIGRAM(S): 500 TABLET, FILM COATED ORAL at 21:33

## 2018-02-02 RX ADMIN — Medication 650 MILLIGRAM(S): at 06:45

## 2018-02-02 RX ADMIN — Medication 650 MILLIGRAM(S): at 01:07

## 2018-02-02 RX ADMIN — PANTOPRAZOLE SODIUM 40 MILLIGRAM(S): 20 TABLET, DELAYED RELEASE ORAL at 05:09

## 2018-02-02 RX ADMIN — CEFTRIAXONE 100 GRAM(S): 500 INJECTION, POWDER, FOR SOLUTION INTRAMUSCULAR; INTRAVENOUS at 21:33

## 2018-02-02 RX ADMIN — ATORVASTATIN CALCIUM 10 MILLIGRAM(S): 80 TABLET, FILM COATED ORAL at 21:32

## 2018-02-02 RX ADMIN — Medication 650 MILLIGRAM(S): at 05:51

## 2018-02-02 RX ADMIN — Medication 650 MILLIGRAM(S): at 14:56

## 2018-02-02 RX ADMIN — Medication 650 MILLIGRAM(S): at 01:50

## 2018-02-02 RX ADMIN — Medication 3 MILLILITER(S): at 09:46

## 2018-02-02 RX ADMIN — Medication 3 MILLILITER(S): at 21:37

## 2018-02-02 RX ADMIN — Medication 3 MILLILITER(S): at 14:34

## 2018-02-02 RX ADMIN — Medication 650 MILLIGRAM(S): at 13:42

## 2018-02-02 RX ADMIN — Medication 30 MILLILITER(S): at 13:43

## 2018-02-02 RX ADMIN — LIDOCAINE 1 PATCH: 4 CREAM TOPICAL at 12:15

## 2018-02-02 RX ADMIN — LOSARTAN POTASSIUM 25 MILLIGRAM(S): 100 TABLET, FILM COATED ORAL at 13:37

## 2018-02-02 NOTE — PROGRESS NOTE ADULT - SUBJECTIVE AND OBJECTIVE BOX
INTERVAL HPI/OVERNIGHT EVENTS: no acute events  pt seen and examined, pt SOB improving, but still coughing and pt is anxious and says she feels nervous and weak.    VITAL SIGNS:  Vital Signs Last 24 Hrs  T(C): 36.9 (02 Feb 2018 05:02), Max: 37.2 (01 Feb 2018 13:53)  T(F): 98.4 (02 Feb 2018 05:02), Max: 98.9 (01 Feb 2018 13:53)  HR: 64 (02 Feb 2018 11:38) (63 - 77)  BP: 165/75 (02 Feb 2018 11:38) (148/65 - 166/88)  BP(mean): --  RR: 16 (02 Feb 2018 05:02) (16 - 17)  SpO2: 97% (02 Feb 2018 11:38) (94% - 97%)    PHYSICAL EXAM:    Constitutional: NAD  Respiratory: CTAB  Cardiovascular: RRR, S1, S2 present  Gastrointestinal: ND, NT, BS++  Extremities: no pedal edema  Vascular: intact  Neurological: AAOX3  Musculoskeletal: non focal    MEDICATIONS  (STANDING):  ALBUTerol/ipratropium for Nebulization 3 milliLiter(s) Nebulizer every 6 hours  atorvastatin 10 milliGRAM(s) Oral at bedtime  azithromycin  IVPB 500 milliGRAM(s) IV Intermittent every 24 hours  azithromycin  IVPB      cefTRIAXone   IVPB      cefTRIAXone   IVPB 1 Gram(s) IV Intermittent every 24 hours  enoxaparin Injectable 40 milliGRAM(s) SubCutaneous daily  pantoprazole    Tablet 40 milliGRAM(s) Oral before breakfast    MEDICATIONS  (PRN):      Allergies    Motrin (Unknown)    Intolerances  aspirin (Vomiting)      LABS:    Complete Blood Count (02.02.18 @ 07:59)    WBC Count: 5.0: Test repeated. K/uL    RBC Count: 3.94 M/uL    Hemoglobin: 11.8 g/dL    Hematocrit: 35.4 %    Mean Cell Volume: 90.0 fl    Mean Cell Hemoglobin: 30.1 pg    Mean Cell Hemoglobin Conc: 33.5 gm/dL    Red Cell Distrib Width: 11.2 %    Platelet Count - Automated: 247 K/uL    Basic Metabolic Panel (02.02.18 @ 07:59)    Sodium, Serum: 134 mmol/L    Potassium, Serum: 4.8 mmol/L    Chloride, Serum: 102 mmol/L    Carbon Dioxide, Serum: 25 mmol/L    Anion Gap, Serum: 7 mmol/L    Blood Urea Nitrogen, Serum: 12 mg/dL    Creatinine, Serum: 0.67 mg/dL    Glucose, Serum: 92 mg/dL    Calcium, Total Serum: 8.8 mg/dL    eGFR if Non : 83: Interpretative comment  The units for eGFR are ml/min/1.73m2 (normalized body surface area). The  eGFR is calculated from a serum creatinine using the CKD-EPI equation.  Other variables required for calculation are race, age and sex. Among  patients with chronic kidney disease (CKD), the eGFR is useful in  determining the stage of disease according to KDOQI CKD classification.  All eGFR results are reported numerically with the following  interpretation.          GFR                    With                 Without     (ml/min/1.73 m2)    Kidney Damage       Kidney Damage        >= 90                    Stage 1                     Normal        60-89                    Stage 2                     Decreased GFR        30-59     Stage 3                     Stage 3        15-29                    Stage 4                     Stage 4        < 15                      Stage 5                     Stage 5  Each stage of CKD assumes that the associated GFR level has been in  effect for at least 3 months. Determination of stages one and two (with  eGFR > 59 ml/min/m2) requires estimation of kidney damage for at least 3  months as defined by structural or functional abnormalities.  Limitations: All estimates of GFR will be less accurate for patients at  extremes of muscle mass (including but not limited to frail elderly,  critically ill, or cancer patients), those with unusual diets, and those  with conditions associated with reduced secretion or extrarenal  elimination of creatinine. The eGFR equation is not recommended for use  in patients with unstable creatinine levels. mL/min/1.73M2    eGFR if African American: 96 mL/min/1.73M2          RADIOLOGY & ADDITIONAL TESTS:  < from: CT Head No Cont (01.29.18 @ 17:18) >  No acute intracranial hemorrhage or acute territorial infarct.    Probable small meningioma left temporal region. Confirmation recommended   with MRI of the brain with contrast    Air-fluid levels in the maxillary sinuses which may represent component   of acute sinusitis. Correlate clinically.    < end of copied text >    < from: CT Chest No Cont (01.29.18 @ 20:44) >  Bilateral airspace opacities as described above which may be infectious   in etiology in the correct clinical setting. Correlate clinically and   follow-up recommended.    < end of copied text >

## 2018-02-02 NOTE — PHYSICAL THERAPY INITIAL EVALUATION ADULT - GENERAL OBSERVATIONS, REHAB EVAL
Consult received, chart reviewed. Patient received seated in chair, NAD. Patient agreed to EVALUATION from Physical Therapist.

## 2018-02-02 NOTE — PHYSICAL THERAPY INITIAL EVALUATION ADULT - CRITERIA FOR SKILLED THERAPEUTIC INTERVENTIONS
rehab potential/functional limitations in following categories/anticipated discharge recommendation/predicted duration of therapy intervention/impairments found/risk reduction/prevention/therapy frequency

## 2018-02-03 RX ORDER — CEFUROXIME AXETIL 250 MG
500 TABLET ORAL EVERY 12 HOURS
Qty: 0 | Refills: 0 | Status: DISCONTINUED | OUTPATIENT
Start: 2018-02-03 | End: 2018-02-05

## 2018-02-03 RX ORDER — AZITHROMYCIN 500 MG/1
500 TABLET, FILM COATED ORAL ONCE
Qty: 0 | Refills: 0 | Status: COMPLETED | OUTPATIENT
Start: 2018-02-03 | End: 2018-02-03

## 2018-02-03 RX ADMIN — ATORVASTATIN CALCIUM 10 MILLIGRAM(S): 80 TABLET, FILM COATED ORAL at 21:54

## 2018-02-03 RX ADMIN — Medication 3 MILLILITER(S): at 15:03

## 2018-02-03 RX ADMIN — LOSARTAN POTASSIUM 50 MILLIGRAM(S): 100 TABLET, FILM COATED ORAL at 06:15

## 2018-02-03 RX ADMIN — Medication 500 MILLIGRAM(S): at 22:40

## 2018-02-03 RX ADMIN — Medication 650 MILLIGRAM(S): at 13:39

## 2018-02-03 RX ADMIN — LIDOCAINE 1 PATCH: 4 CREAM TOPICAL at 13:06

## 2018-02-03 RX ADMIN — AZITHROMYCIN 500 MILLIGRAM(S): 500 TABLET, FILM COATED ORAL at 22:40

## 2018-02-03 RX ADMIN — Medication 650 MILLIGRAM(S): at 06:17

## 2018-02-03 RX ADMIN — Medication 30 MILLILITER(S): at 21:54

## 2018-02-03 RX ADMIN — Medication 650 MILLIGRAM(S): at 06:55

## 2018-02-03 RX ADMIN — PANTOPRAZOLE SODIUM 40 MILLIGRAM(S): 20 TABLET, DELAYED RELEASE ORAL at 06:15

## 2018-02-03 RX ADMIN — Medication 3 MILLILITER(S): at 20:55

## 2018-02-03 RX ADMIN — Medication 100 MILLIGRAM(S): at 21:54

## 2018-02-03 RX ADMIN — Medication 3 MILLILITER(S): at 09:34

## 2018-02-03 RX ADMIN — ENOXAPARIN SODIUM 40 MILLIGRAM(S): 100 INJECTION SUBCUTANEOUS at 13:06

## 2018-02-03 RX ADMIN — Medication 100 MILLIGRAM(S): at 09:58

## 2018-02-03 RX ADMIN — LIDOCAINE 1 PATCH: 4 CREAM TOPICAL at 00:08

## 2018-02-03 RX ADMIN — Medication 650 MILLIGRAM(S): at 13:09

## 2018-02-03 NOTE — PROGRESS NOTE ADULT - SUBJECTIVE AND OBJECTIVE BOX
· Subjective and Objective: 	  INTERVAL HPI/OVERNIGHT EVENTS: events noticed,still c/o some sob and chest congestion  and some nauses    VITAL SIGNS:  Vital Signs Last 24 Hrs-stabel reviewd    PHYSICAL EXAM:    Constitutional: NAD  Respiratory: CTAB  Cardiovascular: RRR, S1, S2 present  Gastrointestinal: ND, NT, BS++  Extremities: no pedal edema  Vascular: intact  Neurological: AAOX3  Musculoskeletal: non focal    MEDICATIONS  (STANDING):  ALBUTerol/ipratropium for Nebulization 3 milliLiter(s) Nebulizer every 6 hours  atorvastatin 10 milliGRAM(s) Oral at bedtime  azithromycin  IVPB 500 milliGRAM(s) IV Intermittent every 24 hours  azithromycin  IVPB      cefTRIAXone   IVPB      cefTRIAXone   IVPB 1 Gram(s) IV Intermittent every 24 hours  enoxaparin Injectable 40 milliGRAM(s) SubCutaneous daily  pantoprazole    Tablet 40 milliGRAM(s) Oral before breakfast    MEDICATIONS  (PRN):      Allergies    Motrin (Unknown)    Intolerances  aspirin (Vomiting)      LABS:    Complete Blood Count (02.02.18 @ 07:59)    WBC Count: 5.0: Test repeated. K/uL    RBC Count: 3.94 M/uL    Hemoglobin: 11.8 g/dL    Hematocrit: 35.4 %    Mean Cell Volume: 90.0 fl    Mean Cell Hemoglobin: 30.1 pg    Mean Cell Hemoglobin Conc: 33.5 gm/dL    Red Cell Distrib Width: 11.2 %    Platelet Count - Automated: 247 K/uL    Basic Metabolic Panel (02.02.18 @ 07:59)    Sodium, Serum: 134 mmol/L    Potassium, Serum: 4.8 mmol/L    Chloride, Serum: 102 mmol/L    Carbon Dioxide, Serum: 25 mmol/L    Anion Gap, Serum: 7 mmol/L    Blood Urea Nitrogen, Serum: 12 mg/dL    Creatinine, Serum: 0.67 mg/dL    Glucose, Serum: 92 mg/dL    Calcium, Total Serum: 8.8 mg/dL    eGFR if Non : 83: Interpretative comment  The units for eGFR are ml/min/1.73m2 (normalized body surface area). The  eGFR is calculated from a serum creatinine using the CKD-EPI equation.  Other variables required for calculation are race, age and sex. Among  patients with chronic kidney disease (CKD), the eGFR is useful in  determining the stage of disease according to KDOQI CKD classification.  All eGFR results are reported numerically with the following  interpretation.          GFR                    With                 Without     (ml/min/1.73 m2)    Kidney Damage       Kidney Damage        >= 90                    Stage 1                     Normal        60-89                    Stage 2                     Decreased GFR        30-59     Stage 3                     Stage 3        15-29                    Stage 4                     Stage 4        < 15                      Stage 5                     Stage 5  Each stage of CKD assumes that the associated GFR level has been in  effect for at least 3 months. Determination of stages one and two (with  eGFR > 59 ml/min/m2) requires estimation of kidney damage for at least 3  months as defined by structural or functional abnormalities.  Limitations: All estimates of GFR will be less accurate for patients at  extremes of muscle mass (including but not limited to frail elderly,  critically ill, or cancer patients), those with unusual diets, and those  with conditions associated with reduced secretion or extrarenal  elimination of creatinine. The eGFR equation is not recommended for use  in patients with unstable creatinine levels. mL/min/1.73M2    eGFR if African American: 96 mL/min/1.73M2          RADIOLOGY & ADDITIONAL TESTS:  < from: CT Head No Cont (01.29.18 @ 17:18) >  No acute intracranial hemorrhage or acute territorial infarct.    Probable small meningioma left temporal region. Confirmation recommended   with MRI of the brain with contrast    Air-fluid levels in the maxillary sinuses which may represent component   of acute sinusitis. Correlate clinically.    < end of copied text >    < from: CT Chest No Cont (01.29.18 @ 20:44) >  Bilateral airspace opacities as described above which may be infectious   in etiology in the correct clinical setting. Correlate clinically and   follow-up recommended.    < end of copied text >          Assessment and Plan:   · Assessment		  82F from home, lives alone PMH LBP w/ disc disease, Migraines who comes to the hospital for weakness x1 day. Patient has systemic weakness and is mildly warm to touch, however, no mai sick contacts - only daughter had slight cough, admitted for  b/l Pneumonia and corona virus illness  patient also hyponatremic likely secondary to hctz sporadic use     Problem/Plan - 1:  ·  Problem: Weakness.  Plan: Pt has b/l Pneumonia and corona virus illness-  -CT chest showed Bilateral airspace opacities as described above which may be infectious in etiology in the correct clinical setting.  - on rocephin/azithro  -duonebs  -cont current care     Problem/Plan - 2:  ·  Problem: Hyponatremia.  Plan: resolved  patient had hyponatremia of 128 in the setting of use of HCTZ  -s/p rehydration  Na 135 today     Problem/Plan - 3:  ·  Problem: Need for prophylactic measure.  Plan: IMPROVE score = 2, will give chem dvt ppx  no indication for gi ppx at this time.  -d/c planning on hold

## 2018-02-04 RX ADMIN — LOSARTAN POTASSIUM 50 MILLIGRAM(S): 100 TABLET, FILM COATED ORAL at 06:26

## 2018-02-04 RX ADMIN — Medication 100 MILLIGRAM(S): at 06:25

## 2018-02-04 RX ADMIN — Medication 650 MILLIGRAM(S): at 07:37

## 2018-02-04 RX ADMIN — Medication 650 MILLIGRAM(S): at 06:26

## 2018-02-04 RX ADMIN — ATORVASTATIN CALCIUM 10 MILLIGRAM(S): 80 TABLET, FILM COATED ORAL at 21:12

## 2018-02-04 RX ADMIN — PANTOPRAZOLE SODIUM 40 MILLIGRAM(S): 20 TABLET, DELAYED RELEASE ORAL at 06:26

## 2018-02-04 RX ADMIN — Medication 3 MILLILITER(S): at 20:13

## 2018-02-04 RX ADMIN — Medication 3 MILLILITER(S): at 15:05

## 2018-02-04 RX ADMIN — Medication 60 MILLIGRAM(S): at 14:03

## 2018-02-04 RX ADMIN — LIDOCAINE 1 PATCH: 4 CREAM TOPICAL at 01:00

## 2018-02-04 RX ADMIN — LIDOCAINE 1 PATCH: 4 CREAM TOPICAL at 11:33

## 2018-02-04 RX ADMIN — Medication 500 MILLIGRAM(S): at 11:33

## 2018-02-04 RX ADMIN — Medication 60 MILLIGRAM(S): at 21:12

## 2018-02-04 RX ADMIN — Medication 3 MILLILITER(S): at 09:28

## 2018-02-04 RX ADMIN — Medication 500 MILLIGRAM(S): at 21:13

## 2018-02-04 NOTE — PROGRESS NOTE ADULT - SUBJECTIVE AND OBJECTIVE BOX
· Subjective and Objective: 	  INTERVAL HPI/OVERNIGHT EVENTS:  Patient seen at bedisde,awake,no acute dystress,coughing with   limited sputum    VITAL SIGNS:  Vital Signs Last 24 Hrs-stabel reviewed    PHYSICAL EXAM:    Constitutional: NAD  Respiratory: few rales,mild wheezing  Cardiovascular: RRR, S1, S2 present  Gastrointestinal: ND, NT, BS++  Extremities: no pedal edema  Vascular: intact  Neurological: AAOX3  Musculoskeletal: non focal    MEDICATIONS  (STANDING):  ALBUTerol/ipratropium for Nebulization 3 milliLiter(s) Nebulizer every 6 hours  atorvastatin 10 milliGRAM(s) Oral at bedtime  azithromycin  IVPB 500 milliGRAM(s) IV Intermittent every 24 hours  azithromycin  IVPB      cefTRIAXone   IVPB      cefTRIAXone   IVPB 1 Gram(s) IV Intermittent every 24 hours  enoxaparin Injectable 40 milliGRAM(s) SubCutaneous daily  pantoprazole    Tablet 40 milliGRAM(s) Oral before breakfast    MEDICATIONS  (PRN):      Allergies    Motrin (Unknown)    Intolerances  aspirin (Vomiting)      LABS:    Complete Blood Count (02.02.18 @ 07:59)    WBC Count: 5.0: Test repeated. K/uL    RBC Count: 3.94 M/uL    Hemoglobin: 11.8 g/dL    Hematocrit: 35.4 %    Mean Cell Volume: 90.0 fl    Mean Cell Hemoglobin: 30.1 pg    Mean Cell Hemoglobin Conc: 33.5 gm/dL    Red Cell Distrib Width: 11.2 %    Platelet Count - Automated: 247 K/uL    Basic Metabolic Panel (02.02.18 @ 07:59)    Sodium, Serum: 134 mmol/L    Potassium, Serum: 4.8 mmol/L    Chloride, Serum: 102 mmol/L    Carbon Dioxide, Serum: 25 mmol/L    Anion Gap, Serum: 7 mmol/L    Blood Urea Nitrogen, Serum: 12 mg/dL    Creatinine, Serum: 0.67 mg/dL    Glucose, Serum: 92 mg/dL    Calcium, Total Serum: 8.8 mg/dL    eGFR if Non : 83: Interpretative comment  The units for eGFR are ml/min/1.73m2 (normalized body surface area). The  eGFR is calculated from a serum creatinine using the CKD-EPI equation.  Other variables required for calculation are race, age and sex. Among  patients with chronic kidney disease (CKD), the eGFR is useful in  determining the stage of disease according to KDOQI CKD classification.  All eGFR results are reported numerically with the following  interpretation.          GFR                    With                 Without     (ml/min/1.73 m2)    Kidney Damage       Kidney Damage        >= 90                    Stage 1                     Normal        60-89                    Stage 2                     Decreased GFR        30-59     Stage 3                     Stage 3        15-29                    Stage 4                     Stage 4        < 15                      Stage 5                     Stage 5  Each stage of CKD assumes that the associated GFR level has been in  effect for at least 3 months. Determination of stages one and two (with  eGFR > 59 ml/min/m2) requires estimation of kidney damage for at least 3  months as defined by structural or functional abnormalities.  Limitations: All estimates of GFR will be less accurate for patients at  extremes of muscle mass (including but not limited to frail elderly,  critically ill, or cancer patients), those with unusual diets, and those  with conditions associated with reduced secretion or extrarenal  elimination of creatinine. The eGFR equation is not recommended for use  in patients with unstable creatinine levels. mL/min/1.73M2    eGFR if African American: 96 mL/min/1.73M2              Assessment and Plan:   · Assessment		  82F from home, lives alone PMH LBP w/ disc disease, Migraines who comes to the hospital for weakness x1 day. Patient has systemic weakness and is mildly warm to touch, however, no mai sick contacts - only daughter had slight cough, admitted for  b/l Pneumonia and corona virus illness  patient also hyponatremic likely secondary to hctz sporadic use     Problem/Plan - 1:  ·  Problem: Weakness.  Plan: Pt has b/l Pneumonia and corona virus illness-  -CT chest showed Bilateral airspace opacities as described above which may be infectious in etiology in the correct clinical setting.  - on rocephin/azithro  -duonebs  -cont current care     Problem/Plan - 2:  ·  Problem: Hyponatremia.  Plan: resolved  patient had hyponatremia of 128 in the setting of use of HCTZ  -s/p rehydration  Na 135 today     Problem/Plan - 3:  ·  Problem: Need for prophylactic measure.  Plan: IMPROVE score = 2, will give chem dvt ppx  no indication for gi ppx at this time.  -d/c planning   -oob to chair

## 2018-02-05 VITALS — WEIGHT: 115.96 LBS

## 2018-02-05 PROCEDURE — 87633 RESP VIRUS 12-25 TARGETS: CPT

## 2018-02-05 PROCEDURE — 83036 HEMOGLOBIN GLYCOSYLATED A1C: CPT

## 2018-02-05 PROCEDURE — 87798 DETECT AGENT NOS DNA AMP: CPT

## 2018-02-05 PROCEDURE — 84100 ASSAY OF PHOSPHORUS: CPT

## 2018-02-05 PROCEDURE — 82553 CREATINE MB FRACTION: CPT

## 2018-02-05 PROCEDURE — 97161 PT EVAL LOW COMPLEX 20 MIN: CPT

## 2018-02-05 PROCEDURE — 82607 VITAMIN B-12: CPT

## 2018-02-05 PROCEDURE — 84443 ASSAY THYROID STIM HORMONE: CPT

## 2018-02-05 PROCEDURE — 85045 AUTOMATED RETICULOCYTE COUNT: CPT

## 2018-02-05 PROCEDURE — 85730 THROMBOPLASTIN TIME PARTIAL: CPT

## 2018-02-05 PROCEDURE — 71250 CT THORAX DX C-: CPT

## 2018-02-05 PROCEDURE — 82746 ASSAY OF FOLIC ACID SERUM: CPT

## 2018-02-05 PROCEDURE — 94640 AIRWAY INHALATION TREATMENT: CPT

## 2018-02-05 PROCEDURE — 99285 EMERGENCY DEPT VISIT HI MDM: CPT | Mod: 25

## 2018-02-05 PROCEDURE — 83735 ASSAY OF MAGNESIUM: CPT

## 2018-02-05 PROCEDURE — 85610 PROTHROMBIN TIME: CPT

## 2018-02-05 PROCEDURE — 80061 LIPID PANEL: CPT

## 2018-02-05 PROCEDURE — 80053 COMPREHEN METABOLIC PANEL: CPT

## 2018-02-05 PROCEDURE — 85027 COMPLETE CBC AUTOMATED: CPT

## 2018-02-05 PROCEDURE — 82728 ASSAY OF FERRITIN: CPT

## 2018-02-05 PROCEDURE — 87486 CHLMYD PNEUM DNA AMP PROBE: CPT

## 2018-02-05 PROCEDURE — 80048 BASIC METABOLIC PNL TOTAL CA: CPT

## 2018-02-05 PROCEDURE — 71045 X-RAY EXAM CHEST 1 VIEW: CPT

## 2018-02-05 PROCEDURE — 87581 M.PNEUMON DNA AMP PROBE: CPT

## 2018-02-05 PROCEDURE — 84484 ASSAY OF TROPONIN QUANT: CPT

## 2018-02-05 PROCEDURE — 83550 IRON BINDING TEST: CPT

## 2018-02-05 PROCEDURE — 70450 CT HEAD/BRAIN W/O DYE: CPT

## 2018-02-05 PROCEDURE — 81001 URINALYSIS AUTO W/SCOPE: CPT

## 2018-02-05 PROCEDURE — 96374 THER/PROPH/DIAG INJ IV PUSH: CPT

## 2018-02-05 PROCEDURE — 87086 URINE CULTURE/COLONY COUNT: CPT

## 2018-02-05 RX ORDER — CEFUROXIME AXETIL 250 MG
1 TABLET ORAL
Qty: 4 | Refills: 0 | OUTPATIENT
Start: 2018-02-05 | End: 2018-02-06

## 2018-02-05 RX ORDER — SENNA PLUS 8.6 MG/1
2 TABLET ORAL AT BEDTIME
Qty: 0 | Refills: 0 | Status: DISCONTINUED | OUTPATIENT
Start: 2018-02-05 | End: 2018-02-05

## 2018-02-05 RX ORDER — LOSARTAN POTASSIUM 100 MG/1
1 TABLET, FILM COATED ORAL
Qty: 30 | Refills: 0 | OUTPATIENT
Start: 2018-02-05 | End: 2018-03-06

## 2018-02-05 RX ORDER — DOCUSATE SODIUM 100 MG
100 CAPSULE ORAL
Qty: 0 | Refills: 0 | Status: DISCONTINUED | OUTPATIENT
Start: 2018-02-05 | End: 2018-02-05

## 2018-02-05 RX ORDER — AMLODIPINE BESYLATE 2.5 MG/1
1 TABLET ORAL
Qty: 30 | Refills: 0 | OUTPATIENT
Start: 2018-02-05 | End: 2018-03-06

## 2018-02-05 RX ORDER — LOSARTAN/HYDROCHLOROTHIAZIDE 100MG-25MG
1 TABLET ORAL
Qty: 0 | Refills: 0 | COMMUNITY

## 2018-02-05 RX ORDER — AMLODIPINE BESYLATE 2.5 MG/1
1 TABLET ORAL
Qty: 0 | Refills: 0 | COMMUNITY

## 2018-02-05 RX ADMIN — Medication 500 MILLIGRAM(S): at 05:41

## 2018-02-05 RX ADMIN — Medication 60 MILLIGRAM(S): at 05:42

## 2018-02-05 RX ADMIN — LIDOCAINE 1 PATCH: 4 CREAM TOPICAL at 00:06

## 2018-02-05 RX ADMIN — LIDOCAINE 1 PATCH: 4 CREAM TOPICAL at 11:36

## 2018-02-05 RX ADMIN — Medication 650 MILLIGRAM(S): at 07:00

## 2018-02-05 RX ADMIN — Medication 3 MILLILITER(S): at 08:10

## 2018-02-05 RX ADMIN — LOSARTAN POTASSIUM 50 MILLIGRAM(S): 100 TABLET, FILM COATED ORAL at 05:41

## 2018-02-05 RX ADMIN — Medication 100 MILLIGRAM(S): at 05:42

## 2018-02-05 RX ADMIN — Medication 100 MILLIGRAM(S): at 11:51

## 2018-02-05 RX ADMIN — PANTOPRAZOLE SODIUM 40 MILLIGRAM(S): 20 TABLET, DELAYED RELEASE ORAL at 05:41

## 2018-02-05 RX ADMIN — Medication 650 MILLIGRAM(S): at 05:41

## 2018-02-05 NOTE — PROGRESS NOTE ADULT - SUBJECTIVE AND OBJECTIVE BOX
· Subjective and Objective: 	  INTERVAL HPI/OVERNIGHT EVENTS:  Patient doing better,afebrile,mild cough,no dystress  VITAL SIGNS:  Vital Signs Last 24 Hrs-stabel reviewed    PHYSICAL EXAM:    Constitutional: NAD  Respiratory: few rales,mild wheezing  Cardiovascular: RRR, S1, S2 present  Gastrointestinal: ND, NT, BS++  Extremities: no pedal edema  Vascular: intact  Neurological: AAOX3  Musculoskeletal: non focal    MEDICATIONS  (STANDING):  ALBUTerol/ipratropium for Nebulization 3 milliLiter(s) Nebulizer every 6 hours  atorvastatin 10 milliGRAM(s) Oral at bedtime  azithromycin  IVPB 500 milliGRAM(s) IV Intermittent every 24 hours  azithromycin  IVPB      cefTRIAXone   IVPB      cefTRIAXone   IVPB 1 Gram(s) IV Intermittent every 24 hours  enoxaparin Injectable 40 milliGRAM(s) SubCutaneous daily  pantoprazole    Tablet 40 milliGRAM(s) Oral before breakfast    MEDICATIONS  (PRN):      Allergies    Motrin (Unknown)    Intolerances  aspirin (Vomiting)      LABS:    Complete Blood Count (02.02.18 @ 07:59)    WBC Count: 5.0: Test repeated. K/uL    RBC Count: 3.94 M/uL    Hemoglobin: 11.8 g/dL    Hematocrit: 35.4 %    Mean Cell Volume: 90.0 fl    Mean Cell Hemoglobin: 30.1 pg    Mean Cell Hemoglobin Conc: 33.5 gm/dL    Red Cell Distrib Width: 11.2 %    Platelet Count - Automated: 247 K/uL    Basic Metabolic Panel (02.02.18 @ 07:59)    Sodium, Serum: 134 mmol/L    Potassium, Serum: 4.8 mmol/L    Chloride, Serum: 102 mmol/L    Carbon Dioxide, Serum: 25 mmol/L    Anion Gap, Serum: 7 mmol/L    Blood Urea Nitrogen, Serum: 12 mg/dL    Creatinine, Serum: 0.67 mg/dL    Glucose, Serum: 92 mg/dL    Calcium, Total Serum: 8.8 mg/dL    eGFR if Non : 83: Interpretative comment  The units for eGFR are ml/min/1.73m2 (normalized body surface area). The  eGFR is calculated from a serum creatinine using the CKD-EPI equation.  Other variables required for calculation are race, age and sex. Among  patients with chronic kidney disease (CKD), the eGFR is useful in  determining the stage of disease according to KDOQI CKD classification.  All eGFR results are reported numerically with the following  interpretation.          GFR                    With                 Without     (ml/min/1.73 m2)    Kidney Damage       Kidney Damage        >= 90                    Stage 1                     Normal        60-89                    Stage 2                     Decreased GFR        30-59     Stage 3                     Stage 3        15-29                    Stage 4                     Stage 4        < 15                      Stage 5                     Stage 5  Each stage of CKD assumes that the associated GFR level has been in  effect for at least 3 months. Determination of stages one and two (with  eGFR > 59 ml/min/m2) requires estimation of kidney damage for at least 3  months as defined by structural or functional abnormalities.  Limitations: All estimates of GFR will be less accurate for patients at  extremes of muscle mass (including but not limited to frail elderly,  critically ill, or cancer patients), those with unusual diets, and those  with conditions associated with reduced secretion or extrarenal  elimination of creatinine. The eGFR equation is not recommended for use  in patients with unstable creatinine levels. mL/min/1.73M2    eGFR if African American: 96 mL/min/1.73M2              Assessment and Plan:   · Assessment		  82F from home, lives alone PMH LBP w/ disc disease, Migraines who comes to the hospital for weakness x1 day. Patient has systemic weakness and is mildly warm to touch, however, no mai sick contacts - only daughter had slight cough, admitted for  b/l Pneumonia and corona virus illness  patient also hyponatremic likely secondary to hctz sporadic use     Problem/Plan - 1:  ·  Problem:  b/l Pneumonia and corona virus illness-  -CT chest showed Bilateral airspace opacities as described above which may be infectious in etiology in the correct clinical setting.  - on rocephin/azithro  -duonebs  -cont current care  -complete abx   - medrodosepack prior to d/c     Problem/Plan - 2:  ·  Problem: Hyponatremia.  Plan: resolved  patient had hyponatremia of 128 in the setting of use of HCTZ  -s/p rehydration  Na 135 today     Problem/Plan - 3:  ·  Problem: Need for prophylactic measure.  Plan: IMPROVE score = 2, will give chem dvt ppx  no indication for gi ppx at this time.  -d/c planning -today

## 2018-02-05 NOTE — DIETITIAN INITIAL EVALUATION ADULT. - FACTORS AFF FOOD INTAKE
difficulty chewing/difficulty with food procurement/preparation/persistent constipation/[place]/ethnic/cultural/personal food preferences/pain/vomiting/other (specify)/persistent nausea/weakness, congestion, dizziness, lower back pain

## 2018-02-05 NOTE — DIETITIAN INITIAL EVALUATION ADULT. - NUTRITION INTERVENTION
Feeding Assistance/Collaboration and Referral of Nutrition Care/Medical Food Supplements/Vitamin/Meals and Snack

## 2018-02-05 NOTE — DIETITIAN INITIAL EVALUATION ADULT. - OTHER INFO
Patient seen for LOS x8days. Patient from home lives alone. Visited pt. out of bed in chair, reports appetite fair 2days PTA due to weakness with 1-2epsidoses nausea/vomiting, denies diarrhea, has dentures. Per pt.  Lbs & may have lost wt. but unsure, in house pt. consuming 40-50% of meals & tolerating, declined oral supplement, presently no GI distress, skin intact. D/W RN.

## 2018-03-13 NOTE — ED ADULT TRIAGE NOTE - BP NONINVASIVE DIASTOLIC (MM HG)
Pregnancy at 44 to 40 Weeks   [address] [place], [place] [address] :   You are now getting close to your due date  Your due date is just an estimate of when your baby will be born  Your baby may be born before or after your due date  Your breathing may be easier if your baby has moved down into a head-down position  You may need to urinate more often because the baby may be pressing on your bladder  You may also feel more discomfort and tire easily  You may also be having trouble sleeping  DISCHARGE INSTRUCTIONS:   Seek care immediately if:   · You develop a severe headache that does not go away  · You have new or increased vision changes, such as blurred or spotted vision  · You have new or increased swelling in your face or hands  · You have vaginal spotting or bleeding  · Your water broke or you feel warm water gushing or trickling from your vagina  Contact your healthcare provider if:   · You have more than 5 contractions in 1 hour  · You notice any changes in your baby's movements  · You have abdominal cramps, pressure, or tightening  · You have a change in vaginal discharge  · You have chills or a fever  · You have vaginal itching, burning, or pain  · You have yellow, green, white, or foul-smelling vaginal discharge  · You have pain or burning when you urinate, less urine than usual, or pink or bloody urine  · You have questions or concerns about your condition or care  How to care for yourself at this stage of your pregnancy:   · Eat a variety of healthy foods  Healthy foods include fruits, vegetables, whole-grain breads, low-fat dairy foods, beans, lean meats, and fish  Drink liquids as directed  Ask how much liquid to drink each day and which liquids are best for you  Limit caffeine to less than 200 milligrams each day  Limit your intake of fish to 2 servings each week  Choose fish low in mercury such as canned light tuna, shrimp, crab, salmon, cod, or tilapia   Do not  eat fish high in mercury such as swordfish, tilefish, indira mackerel, and shark  · Take prenatal vitamins as directed  Your need for certain vitamins and minerals, such as folic acid, increases during pregnancy  Prenatal vitamins provide some of the extra vitamins and minerals you need  Prenatal vitamins may also help to decrease the risk of certain birth defects  · Rest as needed  Put your feet up if you have swelling in your ankles and feet  · Do not smoke  If you smoke, it is never too late to quit  Smoking increases your risk of a miscarriage and other health problems during your pregnancy  Smoking can cause your baby to be born too early or weigh less at birth  Ask your healthcare provider for information if you need help quitting  · Do not drink alcohol  Alcohol passes from your body to your baby through the placenta  It can affect your baby's brain development and cause fetal alcohol syndrome (FAS)  FAS is a group of conditions that causes mental, behavior, and growth problems  · Talk to your healthcare provider before you take any medicines  Many medicines may harm your baby if you take them when you are pregnant  Do not take any medicines, vitamins, herbs, or supplements without first talking to your healthcare provider  Never use illegal or street drugs (such as marijuana or cocaine) while you are pregnant  · Talk to your healthcare provider before you travel  You may not be able to travel in an airplane after 36 weeks  He may also recommend that you avoid long road trips  Safety tips:   · Avoid hot tubs and saunas  Do not use a hot tub or sauna while you are pregnant, especially during your first trimester  Hot tubs and saunas may raise your baby's temperature and increase the risk of birth defects  · Avoid toxoplasmosis  This is an infection caused by eating raw meat or being around infected cat feces  It can cause birth defects, miscarriages, and other problems   Wash your hands after you touch raw meat  Make sure any meat is well-cooked before you eat it  Avoid raw eggs and unpasteurized milk  Use gloves or ask someone else to clean your cat's litter box while you are pregnant  · Ask your healthcare provider about travel  The most comfortable time to travel is during the second trimester  Ask your healthcare provider if you can travel after 36 weeks  You may not be able to travel in an airplane after 36 weeks  He may also recommend that you avoid long road trips  Changes that are happening with your baby: Your baby is ready to be born  At birth, your baby may weigh about 6 to 9 pounds and be about 19 to 21 inches long  Your baby may be in a head-down position  Your baby will also rest lower in your abdomen  What you need to know about prenatal care: Your healthcare provider will check your blood pressure and weight  You may also need the following:  · A urine test  may also be done to check for sugar and protein  These can be signs of gestational diabetes or infection  Protein in your urine may also be a sign of preeclampsia  Preeclampsia is a condition that can develop during week 20 or later of your pregnancy  It causes high blood pressure, and it can cause problems with your kidneys and other organs  · Your baby's heart rate  will be checked  © 2017 [address] Information is for End User's use only and may not be sold, redistributed or otherwise used for commercial purposes  All illustrations and images included in CareNotes® are the copyrighted property of A D A M , Inc  or [employer]  The above information is an  only  It is not intended as medical advice for individual conditions or treatments  Talk to your doctor, nurse or pharmacist before following any medical regimen to see if it is safe and effective for you  85

## 2018-11-02 NOTE — ED ADULT NURSE NOTE - AS TEMP SITE
70y Female presents with right hand pain s/p fall 7 days ago.  Pt denies numbness tingling paresthesias in affected limb. Patient also had right heel pain. Went to ED and right hand was splinted with volar slab. Was called back to ED for calcaneous fracture that was subsequently operated on by podiatry.     PAST MEDICAL & SURGICAL HISTORY:  Osteoporosis  No significant past surgical history    Allergies    No Known Allergies    Intolerances      MEDICATIONS  (STANDING):  amoxicillin  875 milliGRAM(s)/clavulanate 1 Tablet(s) Oral two times a day  ascorbic acid 500 milliGRAM(s) Oral daily  benzocaine 15 mG/menthol 3.6 mG Lozenge 1 Lozenge Oral four times a day  calcium carbonate 1250 mG  + Vitamin D (OsCal 500 + D) 1 Tablet(s) Oral two times a day  enoxaparin Injectable 40 milliGRAM(s) SubCutaneous daily  influenza   Vaccine 0.5 milliLiter(s) IntraMuscular once  lactobacillus acidophilus 1 Tablet(s) Oral every 12 hours  sodium chloride 0.9%. 1000 milliLiter(s) (75 mL/Hr) IV Continuous <Continuous>                          13.0   9.83  )-----------( 394      ( 02 Nov 2018 06:00 )             39.1     11-02    140  |  103  |  9   ----------------------------<  101<H>  3.8   |  24  |  0.56    Ca    8.8      02 Nov 2018 06:00          Imaging: R oblique 5th metacarpal shaft neck fracture    Vital Signs Last 24 Hrs  T(C): 36.3 (02 Nov 2018 05:19), Max: 37.9 (01 Nov 2018 14:25)  T(F): 97.3 (02 Nov 2018 05:19), Max: 100.2 (01 Nov 2018 14:25)  HR: 76 (02 Nov 2018 05:19) (76 - 107)  BP: 146/82 (02 Nov 2018 05:19) (108/68 - 146/82)  BP(mean): --  RR: 18 (02 Nov 2018 05:19) (16 - 18)  SpO2: 97% (02 Nov 2018 05:19) (93% - 97%)    PE:  Gen: NAD, AAOx3  RUE: volar slab removed, Skin intact, +swelling at ulnar side of hand, + ecchymosis and swelling of all fingers but only +TTP over 5th MC , + deformity at 5th MC Neck, no bony TTP at fingers/hand/wrist/elbow/shoulder, AIN/PIN/M/R/U/Msc/Ax, SILT C5-T1, +radial pulse, compartments soft. FROM at PIP and DIP of all digits, able to make fist without pain    Secondary Survey: Full ROM of unaffected extremities, SILT globally, compartments soft, no bony TTP over bony prominences, no calf TTP, able to SLR with legs, no TTP along axial spine    A/P 70y Female with R 5th metacarpal shaft fracture    - pain control  - Volar slab reapplied, no reduction performed, no reduction necessary  - encourage ROM of all fingers and digits including affected 5th digit to prevent stiffness  - Partial WB right upper extremity for crutch balance  - keep splint clean dry intact  - rest ice elevate affected hand  - please follow up with Dr. Villa in office in 1-2 weeks from discharge [phone] oral

## 2019-12-30 PROBLEM — M54.5 LOW BACK PAIN: Chronic | Status: ACTIVE | Noted: 2018-01-29

## 2019-12-30 PROBLEM — G43.909 MIGRAINE, UNSPECIFIED, NOT INTRACTABLE, WITHOUT STATUS MIGRAINOSUS: Chronic | Status: ACTIVE | Noted: 2018-01-29

## 2020-02-03 ENCOUNTER — APPOINTMENT (OUTPATIENT)
Dept: UROGYNECOLOGY | Facility: CLINIC | Age: 85
End: 2020-02-03

## 2022-10-17 ENCOUNTER — NON-APPOINTMENT (OUTPATIENT)
Age: 87
End: 2022-10-17

## 2022-12-27 NOTE — ED PROVIDER NOTE - CROS ED PSYCH ALL NEG
Spoke with patient and notified Dr. Dejesus reviewed alkaline phosphatase isoenzyme and bone and liver fraction were elevated and recommends getting an abdominal us and bone scan then follow up after. Order submitted. Patient advised he will receive a call from [facility] to set up tests. Patient wishes to have Adelia scheduling contact his office number to schedule at [phone].     Patient also notes he has been trying to  his script for Trulicity 1.5 mg but prescription is wrong at pharmacy. Writer advised she will contact [facility] to find out issue. Patient verbalize understanding.    Writer spoke with [facility] in regards to Trulicity prescription and original prescription was sent in for 0.75 mg and to do 1.5 mg every 7 days. [facility] requesting a new script for Trulicity 1.5 mg every 7 days. Per office note 12/21/22 patient was to increase Trulicity to 1.5 mg every 7 days. Med list updated and new script for Trulicity 1.5 mg e-scribed to [facility].   negative...

## 2024-11-26 NOTE — PHYSICAL THERAPY INITIAL EVALUATION ADULT - WEIGHT-BEARING RESTRICTIONS: STAND/SIT, REHAB EVAL
Patient is a 69 y.o. male who is 3 weeks s/p ORIF R radius and ulnar shaft fractures.  Date of surgery was 11/6/2024.  Patient continues NWB RUE at this time and denies issues with incisions. Patient continues on ASA for DVT ppx. Patient reports he is out of pain medicine, is asking for refill. Also took his splint off several days ago because it was bothering him.  Patient denies fever or chills, N/T or calf pain.     General: Alert and oriented x 3, NAD, respirations easy and unlabored with no audible wheezes, skin warm and dry, speech and dress appropriate for noted age, affect euthymic.       Musculoskeletal: RUE  Incisions c/d/i  Compartments soft  mild swelling to upper extremity  Sensation intact to light touch  Motor intact including R/U/M/AIN/PIN nn  Palpable R pulse 2+      X-ray: Images of right forearm reviewed personally by me today and reveal maintenance of alignment of radial and ulnar shaft fractures with hardware in position and no interval change.      IMP:  Problem List Items Addressed This Visit    None  Visit Diagnoses       Closed fracture of right distal radius and ulna, initial encounter    -  Primary    Relevant Medications    oxyCODONE (Roxicodone) 5 mg immediate release tablet    Other Relevant Orders    XR forearm right 2 views (Completed)    Closed fracture of lower end of radius with ulna, right, initial encounter                PLAN:  His sutures were removed today and steri strips applied.  He was placed into a long forearm brace, keep the brace on at all times, including for sleeping. He may remove it for showers and gentle forearm motion as instructed. I will see patient back in 5 weeks and I need x-rays R forearm next visit. I will get him set up for outpatient OT sessions after next visit. His pain medicine refill was e-scribed to pharmacy today. All questions were answered today.   I have personally reviewed the OARRS report for this patient. This report is scanned into  the  electronic medical record. I have considered the risks of abuse, dependence, addiction, and diversion.     full weight-bearing

## 2025-01-18 NOTE — DIETITIAN INITIAL EVALUATION ADULT. - NS AS NUTRI INTERV MEALS SNACK
Least restrictive diet due to advance age & chronic illness/General/healthful diet (1) Oriented to own ability

## 2025-04-15 ENCOUNTER — EMERGENCY (EMERGENCY)
Facility: HOSPITAL | Age: 89
LOS: 1 days | End: 2025-04-15
Attending: STUDENT IN AN ORGANIZED HEALTH CARE EDUCATION/TRAINING PROGRAM
Payer: MEDICARE

## 2025-04-15 VITALS
HEART RATE: 64 BPM | RESPIRATION RATE: 16 BRPM | SYSTOLIC BLOOD PRESSURE: 138 MMHG | DIASTOLIC BLOOD PRESSURE: 67 MMHG | OXYGEN SATURATION: 98 % | TEMPERATURE: 99 F

## 2025-04-15 DIAGNOSIS — Z98.49 CATARACT EXTRACTION STATUS, UNSPECIFIED EYE: Chronic | ICD-10-CM

## 2025-04-15 DIAGNOSIS — M20.10 HALLUX VALGUS (ACQUIRED), UNSPECIFIED FOOT: Chronic | ICD-10-CM

## 2025-04-15 PROCEDURE — 99284 EMERGENCY DEPT VISIT MOD MDM: CPT

## 2025-04-15 RX ORDER — ACETAMINOPHEN 500 MG/5ML
1000 LIQUID (ML) ORAL ONCE
Refills: 0 | Status: COMPLETED | OUTPATIENT
Start: 2025-04-15 | End: 2025-04-16

## 2025-04-15 NOTE — ED ADULT TRIAGE NOTE - CHIEF COMPLAINT QUOTE
high blood pressure at home like 180 with headaches, pounding sensation ,  hearing noises all week / EMS

## 2025-04-16 VITALS
HEART RATE: 79 BPM | DIASTOLIC BLOOD PRESSURE: 80 MMHG | TEMPERATURE: 98 F | RESPIRATION RATE: 18 BRPM | SYSTOLIC BLOOD PRESSURE: 144 MMHG | OXYGEN SATURATION: 99 %

## 2025-04-16 LAB
ALBUMIN SERPL ELPH-MCNC: 3.4 G/DL — LOW (ref 3.5–5)
ALP SERPL-CCNC: 145 U/L — HIGH (ref 40–120)
ALT FLD-CCNC: 27 U/L DA — SIGNIFICANT CHANGE UP (ref 10–60)
ANION GAP SERPL CALC-SCNC: 6 MMOL/L — SIGNIFICANT CHANGE UP (ref 5–17)
AST SERPL-CCNC: 17 U/L — SIGNIFICANT CHANGE UP (ref 10–40)
BASOPHILS # BLD AUTO: 0.03 K/UL — SIGNIFICANT CHANGE UP (ref 0–0.2)
BASOPHILS NFR BLD AUTO: 0.6 % — SIGNIFICANT CHANGE UP (ref 0–2)
BILIRUB SERPL-MCNC: 0.4 MG/DL — SIGNIFICANT CHANGE UP (ref 0.2–1.2)
BUN SERPL-MCNC: 14 MG/DL — SIGNIFICANT CHANGE UP (ref 7–18)
CALCIUM SERPL-MCNC: 8.4 MG/DL — SIGNIFICANT CHANGE UP (ref 8.4–10.5)
CHLORIDE SERPL-SCNC: 103 MMOL/L — SIGNIFICANT CHANGE UP (ref 96–108)
CO2 SERPL-SCNC: 25 MMOL/L — SIGNIFICANT CHANGE UP (ref 22–31)
CREAT SERPL-MCNC: 0.59 MG/DL — SIGNIFICANT CHANGE UP (ref 0.5–1.3)
EGFR: 87 ML/MIN/1.73M2 — SIGNIFICANT CHANGE UP
EGFR: 87 ML/MIN/1.73M2 — SIGNIFICANT CHANGE UP
EOSINOPHIL # BLD AUTO: 0.18 K/UL — SIGNIFICANT CHANGE UP (ref 0–0.5)
EOSINOPHIL NFR BLD AUTO: 3.4 % — SIGNIFICANT CHANGE UP (ref 0–6)
GLUCOSE SERPL-MCNC: 109 MG/DL — HIGH (ref 70–99)
HCT VFR BLD CALC: 32.6 % — LOW (ref 34.5–45)
HGB BLD-MCNC: 11.3 G/DL — LOW (ref 11.5–15.5)
IMM GRANULOCYTES NFR BLD AUTO: 0.2 % — SIGNIFICANT CHANGE UP (ref 0–0.9)
LYMPHOCYTES # BLD AUTO: 2.08 K/UL — SIGNIFICANT CHANGE UP (ref 1–3.3)
LYMPHOCYTES # BLD AUTO: 39.2 % — SIGNIFICANT CHANGE UP (ref 13–44)
MCHC RBC-ENTMCNC: 29.9 PG — SIGNIFICANT CHANGE UP (ref 27–34)
MCHC RBC-ENTMCNC: 34.7 G/DL — SIGNIFICANT CHANGE UP (ref 32–36)
MCV RBC AUTO: 86.2 FL — SIGNIFICANT CHANGE UP (ref 80–100)
MONOCYTES # BLD AUTO: 0.48 K/UL — SIGNIFICANT CHANGE UP (ref 0–0.9)
MONOCYTES NFR BLD AUTO: 9 % — SIGNIFICANT CHANGE UP (ref 2–14)
NEUTROPHILS # BLD AUTO: 2.53 K/UL — SIGNIFICANT CHANGE UP (ref 1.8–7.4)
NEUTROPHILS NFR BLD AUTO: 47.6 % — SIGNIFICANT CHANGE UP (ref 43–77)
NRBC BLD AUTO-RTO: 0 /100 WBCS — SIGNIFICANT CHANGE UP (ref 0–0)
PLATELET # BLD AUTO: 219 K/UL — SIGNIFICANT CHANGE UP (ref 150–400)
POTASSIUM SERPL-MCNC: 4.5 MMOL/L — SIGNIFICANT CHANGE UP (ref 3.5–5.3)
POTASSIUM SERPL-SCNC: 4.5 MMOL/L — SIGNIFICANT CHANGE UP (ref 3.5–5.3)
PROT SERPL-MCNC: 6.7 G/DL — SIGNIFICANT CHANGE UP (ref 6–8.3)
RBC # BLD: 3.78 M/UL — LOW (ref 3.8–5.2)
RBC # FLD: 12.9 % — SIGNIFICANT CHANGE UP (ref 10.3–14.5)
SODIUM SERPL-SCNC: 134 MMOL/L — LOW (ref 135–145)
TROPONIN I, HIGH SENSITIVITY RESULT: 5.8 NG/L — SIGNIFICANT CHANGE UP
WBC # BLD: 5.31 K/UL — SIGNIFICANT CHANGE UP (ref 3.8–10.5)
WBC # FLD AUTO: 5.31 K/UL — SIGNIFICANT CHANGE UP (ref 3.8–10.5)

## 2025-04-16 PROCEDURE — 36415 COLL VENOUS BLD VENIPUNCTURE: CPT

## 2025-04-16 PROCEDURE — 93005 ELECTROCARDIOGRAM TRACING: CPT

## 2025-04-16 PROCEDURE — 70450 CT HEAD/BRAIN W/O DYE: CPT | Mod: MC

## 2025-04-16 PROCEDURE — 99285 EMERGENCY DEPT VISIT HI MDM: CPT | Mod: 25

## 2025-04-16 PROCEDURE — 84484 ASSAY OF TROPONIN QUANT: CPT

## 2025-04-16 PROCEDURE — 82962 GLUCOSE BLOOD TEST: CPT

## 2025-04-16 PROCEDURE — 96375 TX/PRO/DX INJ NEW DRUG ADDON: CPT

## 2025-04-16 PROCEDURE — 80053 COMPREHEN METABOLIC PANEL: CPT

## 2025-04-16 PROCEDURE — 96374 THER/PROPH/DIAG INJ IV PUSH: CPT

## 2025-04-16 PROCEDURE — 70450 CT HEAD/BRAIN W/O DYE: CPT | Mod: 26

## 2025-04-16 PROCEDURE — 85025 COMPLETE CBC W/AUTO DIFF WBC: CPT

## 2025-04-16 RX ORDER — METOCLOPRAMIDE HCL 10 MG
10 TABLET ORAL ONCE
Refills: 0 | Status: COMPLETED | OUTPATIENT
Start: 2025-04-16 | End: 2025-04-16

## 2025-04-16 RX ADMIN — Medication 1000 MILLILITER(S): at 01:47

## 2025-04-16 RX ADMIN — Medication 10 MILLIGRAM(S): at 01:48

## 2025-04-16 RX ADMIN — Medication 400 MILLIGRAM(S): at 00:34

## 2025-04-16 NOTE — ED ADULT NURSE NOTE - OBJECTIVE STATEMENT
pt reports to ED, Tajik speaking.  ID #[id number] used to communicate with pt. pt reports she had high blood pressure x today. pt denies chest pain or heart palpitations. pt reports she had headache that has resolved. pt reports chills.

## 2025-04-16 NOTE — ED PROVIDER NOTE - PATIENT PORTAL LINK FT
You can access the FollowMyHealth Patient Portal offered by [facility] by registering at the following website: [email or web address]. By joining [facility]’s FollowMyHealth portal, you will also be able to view your health information using other applications (apps) compatible with our system.

## 2025-04-16 NOTE — ED PROVIDER NOTE - OBJECTIVE STATEMENT
89-year-old female, history of hypertension, presenting with chief complaint of elevated blood pressure at home and headache.   Patient complaining of BP  180 at home.  She endorses generalized headache, describes it as a heaviness which has been intermittent for the past 2 weeks.  Patient states that headache is currently improved.  Patient endorsing macular degenerative disease, for which she sees ophthalmology, receives an injection every 2 weeks.  Patient endorsing worsening vision on a daily basis.  She falls up with ophthalmology for this issue, vision worsening is secondary to macular degenerative disease per patient.  Patient denying any nausea, vomiting, fever or chills.  Patient states that she slipped in the shower today as the neighbors upstairs scared her while making noise, she denies any head injury, however does endorse right-sided knee pain.  Ambulates with cane at home.

## 2025-04-16 NOTE — ED ADULT NURSE NOTE - CHIEF COMPLAINT QUOTE
Problem: Knowledge Deficit  Goal: *Verbalizes understanding of procedures and medications  Outcome: Progressing Towards Goal  Patient here for first day of chemotherapy. Reviewed the process and procedure with patient and she verbalizes understanding. Encouraged her to ask questions as they arise. high blood pressure at home like 180 with headaches, pounding sensation ,  hearing noises all week / EMS

## 2025-04-16 NOTE — ED ADULT NURSE NOTE - NSFALLRISKINTERV_ED_ALL_ED
yellow gown and moved in view of nurses station. fall alarm in place/Assistance OOB with selected safe patient handling equipment if applicable/Assistance with ambulation/Communicate fall risk and risk factors to all staff, patient, and family/Monitor gait and stability/Provide patient with walking aids/Provide visual cue: yellow wristband, yellow gown, etc/Reinforce activity limits and safety measures with patient and family/Call bell, personal items and telephone in reach/Instruct patient to call for assistance before getting out of bed/chair/stretcher/Non-slip footwear applied when patient is off stretcher/[place] to call system/Physically safe environment - no spills, clutter or unnecessary equipment/Purposeful Proactive Rounding/Room/bathroom lighting operational, light cord in reach

## 2025-04-16 NOTE — ED ADULT NURSE NOTE - CAS ELECT INFOMATION PROVIDED
Discharge education provided to patient, patient verbalized understanding. ambulating with steady gait with cane upon discharge, a/ox4. Instructed to follow up with PCP/DC instructions

## 2025-04-16 NOTE — ED PROVIDER NOTE - NSFOLLOWUPINSTRUCTIONS_ED_ALL_ED_FT
– Return for any worsening or concerning symptoms (see below).  – Follow up with primary care doctor in the next 5 to 7 days.     Headache    A headache is pain or discomfort felt around the head or neck area. The specific cause of a headache may not be found as there are many types including tension headaches, migraine headaches, and cluster headaches. Watch your condition for any changes. Things you can do to manage your pain include taking over the counter and prescription medications as instructed by your health care provider, lying down in a dark quiet room, limiting stress, getting regular sleep, and refraining from alcohol and tobacco products.    SEEK IMMEDIATE MEDICAL CARE IF YOU HAVE ANY OF THE FOLLOWING SYMPTOMS: fever, vomiting, stiff neck, loss of vision, problems with speech, muscle weakness, loss of balance, trouble walking, passing out, or confusion.      Hypertension    Hypertension, commonly called high blood pressure, is when the force of blood pumping through your arteries is too strong. Hypertension forces your heart to work harder to pump blood. Your arteries may become narrow or stiff. Having untreated or uncontrolled hypertension for a long period of time can cause heart attack, stroke, kidney disease, and other problems. If started on a medication, take exactly as prescribed by your health care professional. Maintain a healthy lifestyle and follow up with your primary care physician.    SEEK IMMEDIATE MEDICAL CARE IF YOU HAVE ANY OF THE FOLLOWING SYMPTOMS: severe headache, confusion, chest pain, abdominal pain, vomiting, or shortness of breath.    Hypertension, Adult    High blood pressure (hypertension) is when the force of blood pumping through the arteries is too strong. The arteries are the blood vessels that carry blood from the heart throughout the body. Hypertension forces the heart to work harder to pump blood and may cause arteries to become narrow or stiff. Untreated or uncontrolled hypertension can cause a heart attack, heart failure, a stroke, kidney disease, and other problems.    A blood pressure reading consists of a higher number over a lower number. Ideally, your blood pressure should be below 120/80. The first ("top") number is called the systolic pressure. It is a measure of the pressure in your arteries as your heart beats. The second ("bottom") number is called the diastolic pressure. It is a measure of the pressure in your arteries as the heart relaxes.    What are the causes?    The exact cause of this condition is not known. There are some conditions that result in or are related to high blood pressure.    What increases the risk?  Some risk factors for high blood pressure are under your control.     The following factors may make you more likely to develop this condition:  Smoking.  Having type 2 diabetes mellitus, high cholesterol, or both.  Not getting enough exercise or physical activity.  Being overweight.  Having too much fat, sugar, calories, or salt (sodium) in your diet.  Drinking too much alcohol.  Some risk factors for high blood pressure may be difficult or impossible to change.     Some of these factors include:  Having chronic kidney disease.  Having a family history of high blood pressure.  Age. Risk increases with age.  Race. You may be at higher risk if you are   Gender. Men are at higher risk than women before age 45. After age 65, women are at higher risk than men.  Having obstructive sleep apnea.  Stress.    What are the signs or symptoms?  High blood pressure may not cause symptoms.   Very high blood pressure (hypertensive crisis) may cause:  Headache.  Anxiety.  Shortness of breath.  Nosebleed.  Nausea and vomiting.  Vision changes.  Severe chest pain.  Seizures.    How is this diagnosed?  This condition is diagnosed by measuring your blood pressure while you are seated, with your arm resting on a flat surface, your legs uncrossed, and your feet flat on the floor. The cuff of the blood pressure monitor will be placed directly against the skin of your upper arm at the level of your heart. It should be measured at least twice using the same arm. Certain conditions can cause a difference in blood pressure between your right and left arms.  Certain factors can cause blood pressure readings to be lower or higher than normal for a short period of time:    When your blood pressure is higher when you are in a health care provider's office than when you are at home, this is called white coat hypertension. Most people with this condition do not need medicines.    When your blood pressure is higher at home than when you are in a health care provider's office, this is called masked hypertension. Most people with this condition may need medicines to control blood pressure.    If you have a high blood pressure reading during one visit or you have normal blood pressure with other risk factors, you may be asked to:  Return on a different day to have your blood pressure checked again.  Monitor your blood pressure at home for 1 week or longer.    If you are diagnosed with hypertension, you may have other blood or imaging tests to help your health care provider understand your overall risk for other conditions.    How is this treated?  This condition is treated by making healthy lifestyle changes, such as eating healthy foods, exercising more, and reducing your alcohol intake.     Your health care provider may prescribe medicine if lifestyle changes are not enough to get your blood pressure under control, and if:    Your systolic blood pressure is above 130.  Your diastolic blood pressure is above 80.    Your personal target blood pressure may vary depending on your medical conditions, your age, and other factors.  Follow these instructions at home:    Eating and drinking     Eat a diet that is high in fiber and potassium, and low in sodium, added sugar, and fat. An example eating plan is called the DASH (Dietary Approaches to Stop Hypertension) diet. To eat this way:    Eat plenty of fresh fruits and vegetables. Try to fill one half of your plate at each meal with fruits and vegetables.Eat whole grains, such as whole-wheat pasta, brown rice, or whole-grain bread. Fill about one fourth of your plate with whole grains.    Eat or drink low-fat dairy products, such as skim milk or low-fat yogurt.Avoid fatty cuts of meat, processed or cured meats, and poultry with skin. Fill about one fourth of your plate with lean proteins, such as fish, chicken without skin, beans, eggs, or tofu    Avoid pre-made and processed foods. These tend to be higher in sodium, added sugar, and fat.    Reduce your daily sodium intake.     Most people with hypertension should eat less than 1,500 mg of sodium a day.Do not drink alcohol if:    Your health care provider tells you not to drink.You are pregnant, may be pregnant, or are planning to become pregnant.If you drink alcohol:    Limit how much you use to:  0–1 drink a day for women.0–2 drinks a day for men.  Be aware of how much alcohol is in your drink. In the [place] one drink equals one 12 oz bottle of beer (355 mL), one 5 oz glass of wine (148 mL), or one 1½ oz glass of hard liquor (44 mL).Lifestyle      Work with your health care provider to maintain a healthy body weight or to lose weight. Ask what an ideal weight is for you.Get at least 30 minutes of exercise most days of the week. Activities may include walking, swimming, or biking.Include exercise to strengthen your muscles (resistance exercise), such as Pilates or lifting weights, as part of your weekly exercise routine. Try to do these types of exercises for 30 minutes at least 3 days a week.Do not use any products that contain nicotine or tobacco, such as cigarettes, e-cigarettes, and chewing tobacco. If you need help quitting, ask your health care provider.Monitor your blood pressure at home as told by your health care provider.Keep all follow-up visits as told by your health care provider. This is important.Medicines     Take over-the-counter and prescription medicines only as told by your health care provider. Follow directions carefully. Blood pressure medicines must be taken as prescribed.Do not skip doses of blood pressure medicine. Doing this puts you at risk for problems and can make the medicine less effective.Ask your health care provider about side effects or reactions to medicines that you should watch for.    Contact a health care provider if you:  Think you are having a reaction to a medicine you are taking.Have headaches that keep coming back (recurring).Feel dizzy.Have swelling in your ankles.Have trouble with your vision.    Get help right away if you:  Develop a severe headache or confusion.Have unusual weakness or numbness.Feel faint.Have severe pain in your chest or abdomen.Vomit repeatedly.Have trouble breathing.    Summary  Hypertension is when the force of blood pumping through your arteries is too strong. If this condition is not controlled, it may put you at risk for serious complications.Your personal target blood pressure may vary depending on your medical conditions, your age, and other factors. For most people, a normal blood pressure is less than 120/80.Hypertension is treated with lifestyle changes, medicines, or a combination of both. Lifestyle changes include losing weight, eating a healthy, low-sodium diet, exercising more, and limiting alcohol.This information is not intended to replace advice given to you by your health care provider. Make sure you discuss any questions you have with your health care provider.    1. TAKE ALL MEDICATIONS AS DIRECTED.    2. FOR PAIN OR FEVER YOU CAN TAKE IBUPROFEN (MOTRIN, ADVIL) OR ACETAMINOPHEN (TYLENOL) AS NEEDED, AS DIRECTED ON PACKAGING.  3. FOLLOW UP WITH YOUR PRIMARY DOCTOR WITHIN 5 DAYS AS DIRECTED.  4. IF YOU HAD LABS OR IMAGING DONE, YOU WERE GIVEN COPIES OF ALL LABS AND/OR IMAGING RESULTS FROM YOUR ER VISIT--PLEASE TAKE THEM WITH YOU TO YOUR FOLLOW UP APPOINTMENTS.  5. RETURN TO THE ER FOR ANY WORSENING SYMPTOMS OR CONCERNS.

## 2025-04-16 NOTE — ED PROVIDER NOTE - CLINICAL SUMMARY MEDICAL DECISION MAKING FREE TEXT BOX
89-year-old female, history of hypertension, presenting with chief complaint of elevated blood pressure at home and headache, and R knee pain s/p mechanical fall. /76, hemodynamically stable.   Abdomen soft, nontender nondistended.  Normal neurological exam.  PERRLA.  Obtain CT head given headache and high BP at home.  Will obtain x-ray imaging of the knee, some right knee minor swelling and tenderness palpation.  No effusion, no erythema, north on palpation, no fever suggest septic arthritis.

## 2025-04-28 ENCOUNTER — EMERGENCY (EMERGENCY)
Facility: HOSPITAL | Age: 89
LOS: 1 days | End: 2025-04-28
Attending: STUDENT IN AN ORGANIZED HEALTH CARE EDUCATION/TRAINING PROGRAM
Payer: MEDICARE

## 2025-04-28 VITALS
HEIGHT: 59 IN | RESPIRATION RATE: 16 BRPM | WEIGHT: 119.93 LBS | OXYGEN SATURATION: 98 % | SYSTOLIC BLOOD PRESSURE: 136 MMHG | HEART RATE: 82 BPM | TEMPERATURE: 98 F | DIASTOLIC BLOOD PRESSURE: 87 MMHG

## 2025-04-28 VITALS
OXYGEN SATURATION: 98 % | DIASTOLIC BLOOD PRESSURE: 81 MMHG | SYSTOLIC BLOOD PRESSURE: 125 MMHG | RESPIRATION RATE: 16 BRPM | HEART RATE: 81 BPM | TEMPERATURE: 98 F

## 2025-04-28 DIAGNOSIS — Z98.49 CATARACT EXTRACTION STATUS, UNSPECIFIED EYE: Chronic | ICD-10-CM

## 2025-04-28 DIAGNOSIS — M20.10 HALLUX VALGUS (ACQUIRED), UNSPECIFIED FOOT: Chronic | ICD-10-CM

## 2025-04-28 PROCEDURE — 71045 X-RAY EXAM CHEST 1 VIEW: CPT

## 2025-04-28 PROCEDURE — 93010 ELECTROCARDIOGRAM REPORT: CPT

## 2025-04-28 PROCEDURE — 99284 EMERGENCY DEPT VISIT MOD MDM: CPT

## 2025-04-28 PROCEDURE — 93005 ELECTROCARDIOGRAM TRACING: CPT

## 2025-04-28 PROCEDURE — 99283 EMERGENCY DEPT VISIT LOW MDM: CPT | Mod: 25

## 2025-04-28 PROCEDURE — 71045 X-RAY EXAM CHEST 1 VIEW: CPT | Mod: 26

## 2025-04-28 PROCEDURE — 94640 AIRWAY INHALATION TREATMENT: CPT

## 2025-04-28 RX ORDER — IPRATROPIUM BROMIDE AND ALBUTEROL SULFATE .5; 2.5 MG/3ML; MG/3ML
3 SOLUTION RESPIRATORY (INHALATION) ONCE
Refills: 0 | Status: COMPLETED | OUTPATIENT
Start: 2025-04-28 | End: 2025-04-28

## 2025-04-28 RX ORDER — ALBUTEROL SULFATE 2.5 MG/3ML
2 VIAL, NEBULIZER (ML) INHALATION
Qty: 1 | Refills: 0
Start: 2025-04-28 | End: 2025-05-11

## 2025-04-28 RX ADMIN — IPRATROPIUM BROMIDE AND ALBUTEROL SULFATE 3 MILLILITER(S): .5; 2.5 SOLUTION RESPIRATORY (INHALATION) at 16:37

## 2025-04-28 NOTE — ED PROVIDER NOTE - PATIENT PORTAL LINK FT
You can access the FollowMyHealth Patient Portal offered by [facility] by registering at the following website: [email or web address]. By joining [employer]’s FollowMyHealth portal, you will also be able to view your health information using other applications (apps) compatible with our system.

## 2025-04-28 NOTE — ED PROVIDER NOTE - CLINICAL SUMMARY MEDICAL DECISION MAKING FREE TEXT BOX
Patient is in no respiratory distress,  Patient at bedside is able to speak in full sentences.  EKG without evidence of ischemia.  Throughout time in the ED patient's oxygen is normal.
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
## 2025-04-28 NOTE — ED PROVIDER NOTE - PHYSICAL EXAMINATION
GENERAL APPEARANCE:  AAOx3, generally well-appearing, no acute distress. Appears stated age.  HEENT:  NCAT. Moist mucous membranes. EOMI, clear conjunctiva, no slceral icterus, oropharynx clear.  NECK:  Supple without lymphadenopathy. No JVD.  No neck stiffness or restricted ROM.  HEART:  Normal heart rate and regular rhythm. No murmur.   LUNGS:  CTAB, moving air well. No crackles or wheezes are heard.  ABDOMEN:  Soft, nontender, non-distended. Negative Dc. Negative McBurney. No rebound or guarding.  CHEST/BACK: Chest wall non-tender. No CVAT, or midline cervical/thoracic/lumbar tenderness to palpation. No obvious deformity of chest wall or back.  EXTREMITIES:  Without cyanosis, clubbing or edema. Pulse intact x 4. FROM x4. Compartments soft in all extremities.  NEUROLOGICAL:  Grossly non-focal. Alert and oriented, moving all 4 extremities. CN II-XII grossly intact. Observed to ambulate with normal gait.  SKIN:  Warm and dry without any rash.  PSYCH: Calm, cooperative. Normal mood and affect. Demonstrates proper insight and judgement.

## 2025-07-22 NOTE — ED PROVIDER NOTE - PROGRESS NOTE DETAILS
show Bonny FARAH: Patient refusing knee x-ray, states that she has any MRI scheduled.  Patient reassessed at bedside, feeling well wishes to go home.  CT with no acute findings.  Labs within normal limits.  Strict return precaution celia with patient who verbalizes understanding.  Will discharge.